# Patient Record
Sex: FEMALE | Race: WHITE | NOT HISPANIC OR LATINO | Employment: UNEMPLOYED | URBAN - NONMETROPOLITAN AREA
[De-identification: names, ages, dates, MRNs, and addresses within clinical notes are randomized per-mention and may not be internally consistent; named-entity substitution may affect disease eponyms.]

---

## 2017-01-13 ENCOUNTER — TELEPHONE (OUTPATIENT)
Dept: CARDIOLOGY | Facility: CLINIC | Age: 57
End: 2017-01-13

## 2017-01-13 NOTE — TELEPHONE ENCOUNTER
Dr. Joy is requesting cardiac clearance for hip surgery.  On warfarin, monitored by PCP-history of AVR-[facility]

## 2017-01-17 ENCOUNTER — CLINICAL SUPPORT (OUTPATIENT)
Dept: CARDIOLOGY | Facility: CLINIC | Age: 57
End: 2017-01-17

## 2017-01-17 ENCOUNTER — TELEPHONE (OUTPATIENT)
Dept: CARDIOLOGY | Facility: CLINIC | Age: 57
End: 2017-01-17

## 2017-01-17 VITALS — DIASTOLIC BLOOD PRESSURE: 82 MMHG | SYSTOLIC BLOOD PRESSURE: 142 MMHG | HEART RATE: 83 BPM

## 2017-01-17 NOTE — TELEPHONE ENCOUNTER
Patient was in for BP and HR check. You added Furosemide 20 mg 1/2 tab QD on 12/12/16.   /82 HR 83

## 2017-09-06 ENCOUNTER — OFFICE VISIT (OUTPATIENT)
Dept: CARDIOLOGY | Facility: CLINIC | Age: 57
End: 2017-09-06

## 2017-09-06 VITALS
WEIGHT: 144 LBS | SYSTOLIC BLOOD PRESSURE: 130 MMHG | BODY MASS INDEX: 26.5 KG/M2 | DIASTOLIC BLOOD PRESSURE: 70 MMHG | HEIGHT: 62 IN | HEART RATE: 80 BPM

## 2017-09-06 DIAGNOSIS — E78.2 MIXED HYPERLIPIDEMIA: ICD-10-CM

## 2017-09-06 DIAGNOSIS — Z95.2 S/P AVR: Primary | ICD-10-CM

## 2017-09-06 DIAGNOSIS — Z79.01 CURRENT USE OF LONG TERM ANTICOAGULATION: ICD-10-CM

## 2017-09-06 DIAGNOSIS — J44.9 COPD MIXED TYPE (HCC): ICD-10-CM

## 2017-09-06 PROCEDURE — 99213 OFFICE O/P EST LOW 20 MIN: CPT | Performed by: NURSE PRACTITIONER

## 2017-09-06 RX ORDER — PRAVASTATIN SODIUM 20 MG
20 TABLET ORAL DAILY
COMMUNITY
End: 2022-04-21

## 2017-09-06 RX ORDER — GUAIFENESIN 600 MG/1
600 TABLET, EXTENDED RELEASE ORAL 2 TIMES DAILY PRN
COMMUNITY
End: 2022-04-21

## 2017-09-06 RX ORDER — FENOFIBRATE 160 MG/1
160 TABLET ORAL DAILY
COMMUNITY
End: 2021-03-26 | Stop reason: ALTCHOICE

## 2017-09-06 NOTE — PROGRESS NOTES
"Chief Complaint   Patient presents with   • Follow-up     cardiac management, no recent labs, patient brought medication list with visit.   • Shortness of Breath     \"when walking or over exertion\" patient wears oxygen at 2 liters via NC.        Subjective       Estefany Castañeda is a 56 y.o. female  with a history of bicuspid aortic valve, S/P aortic valve replacement in 1995.  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  She was referred in May 2016 for chest pain and shortness of breath.  Cardiac workup done at that time showed no ischemia, good LV function, and an aortic valve replacement that was well seated and functioning properly.  In December 2016, PCP called the office to report symptoms including chest pain. Repeat echo was done and no changes noted. She continues to be managed medically.   Today she comes to the office and cardiac complaints are voiced. No signs of bleeding reported. Overall, she feels she is stable.     HPI         Cardiac History:    Past Surgical History:   Procedure Laterality Date   • AORTIC VALVE REPAIR/REPLACEMENT  09/1995    [facility]    • CARDIAC CATHETERIZATION  07/18/2009    () Normal Coronaries. Acute takeoff of the LM.    • CARDIOVASCULAR STRESS TEST  05/19/2016    EF 65%, no ischemia   • ECHO - CONVERTED  07/20/2009    () Normal Coronaries. Acute takeoff of the LM.    • ECHO - CONVERTED  05/19/2016    EF 50-55%, mod AS, AVR well seated and well functioning   • ECHO - CONVERTED  12/08/2016    EF 55-60%, mild to mod AS, RVSP 23 mmHg   • US CAROTID UNILATERAL  11/17/2016    bilateral- no hemodynamically significant stenosis       Current Outpatient Prescriptions   Medication Sig Dispense Refill   • Albuterol Sulfate (PROAIR HFA IN) Inhale As Needed.     • diphenhydrAMINE (DIPHENHIST) 25 MG tablet Take 25 mg by mouth Every 6 (Six) Hours As Needed for itching.     • " fenofibrate 160 MG tablet Take 160 mg by mouth Daily.     • gabapentin (NEURONTIN) 800 MG tablet Take 800 mg by mouth 3 (Three) Times a Day.     • guaiFENesin (MUCINEX) 600 MG 12 hr tablet Take 600 mg by mouth 2 (Two) Times a Day.     • insulin aspart (novoLOG) 100 UNIT/ML injection Inject  under the skin 3 (Three) Times a Day Before Meals. Per sliding scale     • insulin glargine (LANTUS) 100 UNIT/ML injection Inject  under the skin Every Night.     • losartan (COZAAR) 25 MG tablet Take 25 mg by mouth Daily.     • Multiple Vitamins-Minerals (CENTRUM SILVER PO) Take  by mouth Daily.     • pravastatin (PRAVACHOL) 20 MG tablet Take 20 mg by mouth Daily.     • raNITIdine (ZANTAC) 150 MG tablet Take 150 mg by mouth 2 (Two) Times a Day.     • sertraline (ZOLOFT) 100 MG tablet Take 100 mg by mouth Daily.     • SITagliptin (JANUVIA) 100 MG tablet Take 100 mg by mouth Daily.     • tiotropium (SPIRIVA) 18 MCG per inhalation capsule Place 1 capsule into inhaler and inhale Daily.     • tiZANidine (ZANAFLEX) 4 MG tablet Take 4 mg by mouth Every Night.     • traZODone (DESYREL) 50 MG tablet Take 50 mg by mouth Every Night.     • warfarin (COUMADIN) 5 MG tablet Take 5 mg by mouth Daily.       No current facility-administered medications for this visit.        Capsaicin and Demerol [meperidine]    Past Medical History:   Diagnosis Date   • Aneurysm      clipping   • Anxiety    • COPD (chronic obstructive pulmonary disease)    • CVA (cerebral vascular accident)    • Depression    • Diabetes mellitus    • GERD (gastroesophageal reflux disease)    • Hypercholesterolemia    • Hypertension    • Mechanical heart valve present        Social History     Social History   • Marital status:      Spouse name: N/A   • Number of children: N/A   • Years of education: N/A     Occupational History   • Not on file.     Social History Main Topics   • Smoking status: Former Smoker     Quit date: 2009   • Smokeless tobacco: Never Used       "Comment: Quit 6 years ago   • Alcohol use Yes      Comment: socially, occasional   • Drug use: No   • Sexual activity: Not on file     Other Topics Concern   • Not on file     Social History Narrative       Family History   Problem Relation Age of Onset   • COPD Mother        Review of Systems   Constitution: Negative for decreased appetite and malaise/fatigue.   HENT: Negative for congestion, nosebleeds and sore throat.    Eyes: Negative for blurred vision.   Respiratory: Positive for shortness of breath. Negative for sleep disturbances due to breathing (uses oxygen).    Endocrine: Negative for cold intolerance and heat intolerance.   Hematologic/Lymphatic: Negative for bleeding problem. Does not bruise/bleed easily.   Skin: Negative for itching, nail changes and skin cancer.   Musculoskeletal: Negative for arthritis and myalgias.   Gastrointestinal: Negative for abdominal pain, dysphagia, heartburn, melena and nausea.   Genitourinary: Negative for dysuria and hematuria.   Neurological: Negative for dizziness, light-headedness, seizures and vertigo.   Psychiatric/Behavioral: Negative for altered mental status and memory loss.   Allergic/Immunologic: Negative for environmental allergies and hives.    Diabetes- Yes  Thyroid-normal    Objective     /70 (BP Location: Left arm)  Pulse 80  Ht 62\" (157.5 cm)  Wt 144 lb (65.3 kg)  BMI 26.34 kg/m2    Physical Exam   Constitutional: She is oriented to person, place, and time. She appears well-nourished.   HENT:   Head: Normocephalic.   Eyes: Conjunctivae are normal. Pupils are equal, round, and reactive to light.   Neck: Normal range of motion. No JVD present.   Cardiovascular: Normal rate, regular rhythm, S1 normal, S2 normal and normal pulses.    Murmur heard.  High-pitched blowing early diastolic murmur is present with a grade of 3/6  at the upper right sternal border Metalic click  Pulmonary/Chest: She has decreased breath sounds in the right lower field and " the left lower field. She has no wheezes. She has no rhonchi. She has no rales.   Wearing portable oxygen per NC   Abdominal: Soft. Bowel sounds are normal. There is no tenderness.   Musculoskeletal: Normal range of motion. She exhibits no edema.   Neurological: She is alert and oriented to person, place, and time.   Skin: Skin is warm and dry. No rash noted. No pallor.   Psychiatric: She has a normal mood and affect. Her behavior is normal.      Procedures        Assessment/Plan      Estefany was seen today for follow-up and shortness of breath.    Diagnoses and all orders for this visit:    S/P AVR    Current use of long term anticoagulation    Mixed hyperlipidemia    COPD mixed type        She follow with you for management of labs and reports lipids have been at goal. Her blood pressure and heart today are normal. The reports of her carotid ultrasound and last echocardiogram were reviewed. No further cardiac testing warranted at this time. Same cardiac medications recommended. Her weight is stable. I encouraged her on good diet and maintaining activity as tolerates.            Electronically signed by CHELSIE Suggs,  September 8, 2017 12:56 PM

## 2018-09-11 ENCOUNTER — OFFICE VISIT (OUTPATIENT)
Dept: CARDIOLOGY | Facility: CLINIC | Age: 58
End: 2018-09-11

## 2018-09-11 VITALS
SYSTOLIC BLOOD PRESSURE: 128 MMHG | DIASTOLIC BLOOD PRESSURE: 68 MMHG | WEIGHT: 139 LBS | HEIGHT: 62 IN | HEART RATE: 76 BPM | BODY MASS INDEX: 25.58 KG/M2

## 2018-09-11 DIAGNOSIS — E11.8 TYPE 2 DIABETES MELLITUS WITH COMPLICATION, WITH LONG-TERM CURRENT USE OF INSULIN (HCC): ICD-10-CM

## 2018-09-11 DIAGNOSIS — Z95.2 S/P AVR: ICD-10-CM

## 2018-09-11 DIAGNOSIS — J44.9 COPD MIXED TYPE (HCC): ICD-10-CM

## 2018-09-11 DIAGNOSIS — Z95.2 AORTIC VALVE REPLACED: Primary | ICD-10-CM

## 2018-09-11 DIAGNOSIS — Z79.4 TYPE 2 DIABETES MELLITUS WITH COMPLICATION, WITH LONG-TERM CURRENT USE OF INSULIN (HCC): ICD-10-CM

## 2018-09-11 DIAGNOSIS — E78.2 MIXED HYPERLIPIDEMIA: ICD-10-CM

## 2018-09-11 PROCEDURE — 99213 OFFICE O/P EST LOW 20 MIN: CPT | Performed by: NURSE PRACTITIONER

## 2018-09-11 RX ORDER — IRBESARTAN 150 MG/1
150 TABLET ORAL DAILY
COMMUNITY
End: 2021-03-26 | Stop reason: ALTCHOICE

## 2018-09-11 RX ORDER — MELATONIN
1000 DAILY
COMMUNITY
End: 2023-02-27

## 2018-09-11 RX ORDER — PAROXETINE HYDROCHLORIDE 40 MG/1
40 TABLET, FILM COATED ORAL EVERY MORNING
COMMUNITY
End: 2020-03-17 | Stop reason: ALTCHOICE

## 2018-09-11 NOTE — PROGRESS NOTES
Chief Complaint   Patient presents with   • Follow-up     Cardiac management. She reports to have labs in 3 weeks per PCP. PCP refills medication.    • Shortness of Breath     Relates to COPD.       Subjective       Estefany Castañeda is a 57 y.o. female with a history of hypertension, hyperlipidemia, diabetes, COPD with remote tobacco use, and bicuspid aortic valve, S/P aortic valve replacement in 1995.  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  She was referred in May 2016 for chest pain and shortness of breath.  Cardiac workup done at that time showed no ischemia, normal LV function, and an aortic valve replacement that was well seated and functioning properly.  In December 2016, PCP called the office to report symptoms including chest pain. Repeat echo was done and no changes noted. She continues to be managed medically. She is anticoagulated with warfarin managed by PCP. She came in today for follow up. She feels well. Denies chest pain, dizziness, syncope. She is short of breath which is chronic and unchanged. Labs will be checked at upcoming appointment with PCP in three weeks. No refills needed.  HPI         Cardiac History:    Past Surgical History:   Procedure Laterality Date   • AORTIC VALVE REPAIR/REPLACEMENT  09/1995    [facility]    • CARDIAC CATHETERIZATION  07/18/2009    () Normal Coronaries. Acute takeoff of the LM.    • CARDIOVASCULAR STRESS TEST  05/19/2016    EF 65%, no ischemia   • ECHO - CONVERTED  07/20/2009    () Normal Coronaries. Acute takeoff of the LM.    • ECHO - CONVERTED  05/19/2016    EF 50-55%, mod AS, AVR well seated and well functioning   • ECHO - CONVERTED  12/08/2016    EF 55-60%, mild to mod AS, RVSP 23 mmHg   • US CAROTID UNILATERAL  11/17/2016    bilateral- no hemodynamically significant stenosis       Current Outpatient Prescriptions   Medication Sig Dispense Refill    • Albuterol Sulfate (PROAIR HFA IN) Inhale As Needed.     • cholecalciferol (VITAMIN D3) 1000 units tablet Take 1,000 Units by mouth Daily.     • diphenhydrAMINE (DIPHENHIST) 25 MG tablet Take 25 mg by mouth 2 (Two) Times a Day.     • fenofibrate 160 MG tablet Take 160 mg by mouth Daily.     • gabapentin (NEURONTIN) 800 MG tablet Take 800 mg by mouth 3 (Three) Times a Day.     • guaiFENesin (MUCINEX) 600 MG 12 hr tablet Take 600 mg by mouth 2 (Two) Times a Day As Needed.     • insulin aspart (novoLOG) 100 UNIT/ML injection Inject  under the skin 3 (Three) Times a Day Before Meals. Per sliding scale     • Insulin Glargine (BASAGLAR KWIKPEN SC) Inject  under the skin into the appropriate area as directed Every Night.     • irbesartan (AVAPRO) 150 MG tablet Take 150 mg by mouth Daily.     • Multiple Vitamins-Minerals (CENTRUM SILVER PO) Take  by mouth Daily.     • O2 (OXYGEN) Inhale Every Night.     • PARoxetine (PAXIL) 40 MG tablet Take 40 mg by mouth Every Morning.     • pravastatin (PRAVACHOL) 20 MG tablet Take 20 mg by mouth Daily.     • raNITIdine (ZANTAC) 150 MG tablet Take 150 mg by mouth 2 (Two) Times a Day.     • SITagliptin (JANUVIA) 100 MG tablet Take 100 mg by mouth Daily.     • tiZANidine (ZANAFLEX) 4 MG tablet Take 4 mg by mouth Every Night.     • traZODone (DESYREL) 50 MG tablet Take 50 mg by mouth Every Night.     • warfarin (COUMADIN) 5 MG tablet Take 5 mg by mouth Daily.       No current facility-administered medications for this visit.        Capsaicin; Crestor [rosuvastatin calcium]; Demerol [meperidine]; Ibuprofen; and Lipitor [atorvastatin]    Past Medical History:   Diagnosis Date   • Aneurysm (CMS/HCC)      clipping   • Anxiety    • COPD (chronic obstructive pulmonary disease) (CMS/HCC)    • CVA (cerebral vascular accident) (CMS/HCC)    • Depression    • Diabetes mellitus (CMS/HCC)    • GERD (gastroesophageal reflux disease)    • Hx of appendectomy 12/2017   • Hypercholesterolemia    •  "Hypertension    • Mechanical heart valve present        Social History     Social History   • Marital status:      Spouse name: N/A   • Number of children: N/A   • Years of education: N/A     Occupational History   • Not on file.     Social History Main Topics   • Smoking status: Former Smoker     Quit date: 2009   • Smokeless tobacco: Never Used      Comment: Quit 6 years ago   • Alcohol use No      Comment: socially, occasional   • Drug use: No   • Sexual activity: Not on file     Other Topics Concern   • Not on file     Social History Narrative   • No narrative on file       Family History   Problem Relation Age of Onset   • COPD Mother        Review of Systems   Constitution: Positive for weight loss (down 5 lb). Negative for decreased appetite, weakness and malaise/fatigue.   HENT: Negative.    Eyes: Negative.    Cardiovascular: Positive for dyspnea on exertion (mild). Negative for chest pain, leg swelling, orthopnea, palpitations and syncope.   Respiratory: Positive for shortness of breath. Negative for cough.    Endocrine: Negative.    Hematologic/Lymphatic: Negative.  Negative for bleeding problem. Does not bruise/bleed easily.   Skin: Negative.    Musculoskeletal: Negative for falls and myalgias.   Gastrointestinal: Negative for abdominal pain, hematemesis and melena.   Genitourinary: Negative for dysuria and hematuria.   Neurological: Negative for dizziness.   Psychiatric/Behavioral: Negative for altered mental status and depression.   Allergic/Immunologic: Negative.       Diabetes- Yes  Thyroid-normal    Objective     /68 (BP Location: Left arm)   Pulse 76   Ht 157.5 cm (62.01\")   Wt 63 kg (139 lb)   BMI 25.42 kg/m²     Physical Exam   Constitutional: She is oriented to person, place, and time. She appears well-developed and well-nourished.   HENT:   Head: Normocephalic and atraumatic.   Eyes: Pupils are equal, round, and reactive to light.   Neck: Normal range of motion. "   Cardiovascular: Normal rate, regular rhythm and intact distal pulses.    Murmur heard.   Systolic murmur is present with a grade of 3/6  at the upper right sternal border  Pulmonary/Chest: Effort normal and breath sounds normal. No respiratory distress. She has no wheezes.   Abdominal: Soft. Bowel sounds are normal. She exhibits no distension.   Musculoskeletal: Normal range of motion. She exhibits no edema.   Neurological: She is alert and oriented to person, place, and time.   Skin: Skin is warm and dry.   Psychiatric: She has a normal mood and affect.   Nursing note and vitals reviewed.     Procedures          Assessment/Plan    Heart rate and blood pressure stable. She remains asymptomatic. Most recent echocardiogram from 2016 reviewed with her. She will be scheduled for repeat echo to assess mechanical aortic valve as it has been almost two years. Continue anticoagulation per [facility] management. She has no s/s bleeding. Continue current medications. She tolerates pravastatin for hyperlipidemia. Could we get a copy of upcoming labs? BMI borderline elevated. Heart healthy diet recommended. Regular exercise 20 minutes daily as she can tolerate. Further recommendations to follow echocardiogram. We will see her back in six months or sooner if needed.   Estefany was seen today for follow-up and shortness of breath.    Diagnoses and all orders for this visit:    Aortic valve replaced  -     Adult Transthoracic Echo Complete W/ Cont if Necessary Per Protocol; Future    S/P AVR    Mixed hyperlipidemia    Type 2 diabetes mellitus with complication, with long-term current use of insulin (CMS/[facility])    COPD mixed type (CMS/HCC)        Patient's Body mass index is 25.42 kg/m². BMI is above normal parameters. Recommendations include: no follow-up required and nutrition counseling.                  Electronically signed by CHELSIE Jimenez,  September 13, 2018 4:47 AM

## 2018-09-13 PROBLEM — I10 ESSENTIAL HYPERTENSION: Status: ACTIVE | Noted: 2018-09-13

## 2019-05-08 ENCOUNTER — OFFICE VISIT (OUTPATIENT)
Dept: CARDIOLOGY | Facility: CLINIC | Age: 59
End: 2019-05-08

## 2019-05-08 VITALS
DIASTOLIC BLOOD PRESSURE: 58 MMHG | OXYGEN SATURATION: 95 % | SYSTOLIC BLOOD PRESSURE: 120 MMHG | HEART RATE: 90 BPM | WEIGHT: 138.5 LBS | BODY MASS INDEX: 25.49 KG/M2 | HEIGHT: 62 IN

## 2019-05-08 DIAGNOSIS — Z79.01 CURRENT USE OF LONG TERM ANTICOAGULATION: ICD-10-CM

## 2019-05-08 DIAGNOSIS — E78.2 MIXED HYPERLIPIDEMIA: ICD-10-CM

## 2019-05-08 DIAGNOSIS — R53.83 OTHER FATIGUE: ICD-10-CM

## 2019-05-08 DIAGNOSIS — R07.89 OTHER CHEST PAIN: ICD-10-CM

## 2019-05-08 DIAGNOSIS — R06.02 SHORTNESS OF BREATH: ICD-10-CM

## 2019-05-08 DIAGNOSIS — Z95.2 S/P AVR: ICD-10-CM

## 2019-05-08 DIAGNOSIS — I10 ESSENTIAL HYPERTENSION: ICD-10-CM

## 2019-05-08 DIAGNOSIS — R01.1 CARDIAC MURMUR: Primary | ICD-10-CM

## 2019-05-08 PROCEDURE — 99214 OFFICE O/P EST MOD 30 MIN: CPT | Performed by: NURSE PRACTITIONER

## 2019-05-08 RX ORDER — ESOMEPRAZOLE MAGNESIUM 40 MG/1
40 CAPSULE, DELAYED RELEASE ORAL
COMMUNITY
End: 2022-04-21

## 2019-05-08 NOTE — PROGRESS NOTES
Chief Complaint   Patient presents with   • Follow-up     Cardiac management.Reports having chest pain, happens often feels like something sitting on her chest, lasting about 30 minutes. Does not take nitroglycerin. Shortness of breath-on 2 liters O2 unless walking then needs 3 liters.   • Med Refill     PCP refills medications. Did not bring in med list, verbally confirmed medications       Subjective       Estefany Castañeda is a 58 y.o. female  with a history of hypertension, hyperlipidemia, diabetes, COPD with remote tobacco use, and bicuspid aortic valve, S/P aortic valve replacement in 1995.  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  She was referred in May 2016 for chest pain and shortness of breath.  Cardiac workup done at that time showed no ischemia, normal LV function, and an aortic valve replacement that was well seated and functioning properly.  In December 2016, PCP called the office to report symptoms including chest pain. Repeat echo was done and no changes noted. She continues to be managed medically. She is anticoagulated with warfarin managed by PCP.     Today she returns to the office for a follow-up visit.  Recently she has developed chest discomfort in the form of pressure that occurs randomly and last about 30 minutes.  She experiences increased shortness of breath during this time.  Does continue to use supplemental oxygen.  No issues with palpitations or near syncope noted.  She also admits to daily blood glucose levels being elevated.  Overall she has more fatigue and generalized weakness.    HPI     Cardiac History:    Past Surgical History:   Procedure Laterality Date   • AORTIC VALVE REPAIR/REPLACEMENT  09/1995    [facility]    • CARDIAC CATHETERIZATION  07/18/2009    () Normal Coronaries. Acute takeoff of the LM.    • CARDIOVASCULAR STRESS TEST  05/19/2016    EF 65%, no ischemia   • ECHO  - CONVERTED  07/20/2009    () Normal Coronaries. Acute takeoff of the LM.    • ECHO - CONVERTED  05/19/2016    EF 50-55%, mod AS, AVR well seated and well functioning   • ECHO - CONVERTED  12/08/2016    EF 55-60%, mild to mod AS, RVSP 23 mmHg   • US CAROTID UNILATERAL  11/17/2016    bilateral- no hemodynamically significant stenosis       Current Outpatient Medications   Medication Sig Dispense Refill   • Albuterol Sulfate (PROAIR HFA IN) Inhale As Needed.     • cholecalciferol (VITAMIN D3) 1000 units tablet Take 1,000 Units by mouth Daily.     • diphenhydrAMINE (DIPHENHIST) 25 MG tablet Take 25 mg by mouth 2 (Two) Times a Day.     • esomeprazole (nexIUM) 40 MG capsule Take 40 mg by mouth Every Morning Before Breakfast.     • fenofibrate 160 MG tablet Take 160 mg by mouth Daily.     • gabapentin (NEURONTIN) 800 MG tablet Take 800 mg by mouth 3 (Three) Times a Day.     • guaiFENesin (MUCINEX) 600 MG 12 hr tablet Take 600 mg by mouth 2 (Two) Times a Day As Needed.     • insulin aspart (novoLOG) 100 UNIT/ML injection Inject  under the skin 3 (Three) Times a Day Before Meals. Per sliding scale     • Insulin Glargine (BASAGLAR KWIKPEN SC) Inject  under the skin into the appropriate area as directed Every Night.     • irbesartan (AVAPRO) 150 MG tablet Take 150 mg by mouth Daily.     • Multiple Vitamins-Minerals (CENTRUM SILVER PO) Take  by mouth Daily.     • O2 (OXYGEN) Inhale Every Night.     • PARoxetine (PAXIL) 40 MG tablet Take 40 mg by mouth Every Morning.     • pravastatin (PRAVACHOL) 20 MG tablet Take 20 mg by mouth Daily.     • SITagliptin (JANUVIA) 100 MG tablet Take 100 mg by mouth Daily.     • tiZANidine (ZANAFLEX) 4 MG tablet Take 4 mg by mouth Every Night.     • traZODone (DESYREL) 50 MG tablet Take 50 mg by mouth Every Night.     • warfarin (COUMADIN) 5 MG tablet Take 5 mg by mouth Daily.       No current facility-administered medications for this visit.        Capsaicin; Crestor [rosuvastatin  "calcium]; Demerol [meperidine]; Ibuprofen; and Lipitor [atorvastatin]    Past Medical History:   Diagnosis Date   • Aneurysm (CMS/HCC)      clipping   • Anxiety    • COPD (chronic obstructive pulmonary disease) (CMS/HCC)    • CVA (cerebral vascular accident) (CMS/HCC)    • Depression    • Diabetes mellitus (CMS/HCC)    • GERD (gastroesophageal reflux disease)    • Hx of appendectomy 12/2017   • Hypercholesterolemia    • Hypertension    • Mechanical heart valve present        Social History     Socioeconomic History   • Marital status:      Spouse name: Not on file   • Number of children: Not on file   • Years of education: Not on file   • Highest education level: Not on file   Tobacco Use   • Smoking status: Former Smoker     Last attempt to quit: 2009     Years since quitting: 10.3   • Smokeless tobacco: Never Used   • Tobacco comment: Quit 6 years ago   Substance and Sexual Activity   • Alcohol use: No     Comment: socially, occasional   • Drug use: No       Family History   Problem Relation Age of Onset   • COPD Mother        Review of Systems   Constitution: Positive for weakness and malaise/fatigue. Negative for decreased appetite.   HENT: Negative for congestion, hoarse voice and nosebleeds.    Eyes: Negative for pain and redness.   Cardiovascular: Positive for chest pain (\"heaviness\") and dyspnea on exertion. Negative for leg swelling and near-syncope.   Respiratory: Positive for cough, shortness of breath and sleep disturbances due to breathing (uses oxygen). Negative for sputum production.    Endocrine: Positive for cold intolerance and heat intolerance.   Hematologic/Lymphatic: Negative for bleeding problem. Does not bruise/bleed easily.   Skin: Negative for dry skin and itching.   Musculoskeletal: Positive for muscle weakness. Negative for falls and muscle cramps.   Gastrointestinal: Negative for abdominal pain, dysphagia, melena and nausea.   Genitourinary: Negative for dysuria and hematuria. " "  Neurological: Positive for light-headedness and numbness (feet ). Negative for headaches.   Psychiatric/Behavioral: Negative for altered mental status and memory loss. The patient has insomnia (at times) and is nervous/anxious.    Allergic/Immunologic: Negative for hives.        Objective     /58 (BP Location: Left arm)   Pulse 90   Ht 157.5 cm (62\")   Wt 62.8 kg (138 lb 8 oz)   SpO2 95%   BMI 25.33 kg/m²     Physical Exam   Constitutional: She is oriented to person, place, and time. She appears well-nourished.   HENT:   Head: Normocephalic.   Eyes: Conjunctivae are normal. Pupils are equal, round, and reactive to light.   Neck: Normal range of motion. Neck supple.   Cardiovascular: Normal rate, regular rhythm and S1 normal.   Murmur heard.  Loud S2   Pulmonary/Chest: She has decreased breath sounds. She has no wheezes. She has no rales.   Abdominal: Soft. Bowel sounds are normal. She exhibits no distension. There is no tenderness.   Musculoskeletal: Normal range of motion. She exhibits no edema.   Neurological: She is alert and oriented to person, place, and time.   Skin: Skin is warm and dry.   Psychiatric: She has a normal mood and affect. Her behavior is normal.      Procedures: none today      Assessment/Plan      Estefany was seen today for follow-up and med refill.    Diagnoses and all orders for this visit:    Cardiac murmur  -     Adult Transthoracic Echo Complete W/ Cont if Necessary Per Protocol; Future    Other chest pain  -     Adult Transthoracic Echo Complete W/ Cont if Necessary Per Protocol; Future  -     Stress Test With Myocardial Perfusion One Day; Future    Essential hypertension  -     Adult Transthoracic Echo Complete W/ Cont if Necessary Per Protocol; Future  -     Stress Test With Myocardial Perfusion One Day; Future    Mixed hyperlipidemia    S/P AVR  -     Adult Transthoracic Echo Complete W/ Cont if Necessary Per Protocol; Future    Current use of long term " anticoagulation    Other fatigue    Shortness of breath    It has been over 2 years since her last echocardiogram.  To reassess aortic valve replacement cardiac murmur a repeat echocardiogram ordered.  She is on Coumadin therapy and follows with you for management of PT/INR.  Currently she denies signs of bleeding and reports most recent INR therapeutic.    Estefany voices concern over recent development of chest heaviness and pressure.  She admits to difficulty controlling blood glucose.  Due to recent symptoms and cardiac risk a repeat nuclear stress test also ordered.    Her blood pressure, heart rate, and heart rhythm today are normal.  No change in medications advised at this time.    For lipid management she is on statin therapy in the form of Pravachol.  She  follows with you for lab orders/management.  Please forward a copy of lab results as available.    Patient's Body mass index is 25.33 kg/m². BMI is slightly  above normal parameters. Recommendations include: nutrition counseling. Diabetic diet encouraged.      Further recommendations based on test results.  A 6-month follow-up visit scheduled.  Please call sooner for any cardiac concerns.           Electronically signed by CHELSIE Suggs,  May 9, 2019 7:31 AM

## 2019-05-16 ENCOUNTER — HOSPITAL ENCOUNTER (OUTPATIENT)
Dept: CARDIOLOGY | Facility: HOSPITAL | Age: 59
Discharge: HOME | End: 2019-05-16

## 2019-05-16 VITALS — WEIGHT: 138.45 LBS | HEIGHT: 62 IN | BODY MASS INDEX: 25.48 KG/M2

## 2019-05-16 DIAGNOSIS — R53.83 OTHER FATIGUE: ICD-10-CM

## 2019-05-16 DIAGNOSIS — R01.1 CARDIAC MURMUR: ICD-10-CM

## 2019-05-16 DIAGNOSIS — R06.02 SHORTNESS OF BREATH: ICD-10-CM

## 2019-05-16 DIAGNOSIS — I10 ESSENTIAL HYPERTENSION: ICD-10-CM

## 2019-05-16 DIAGNOSIS — Z95.2 S/P AVR: ICD-10-CM

## 2019-05-16 DIAGNOSIS — R07.89 OTHER CHEST PAIN: ICD-10-CM

## 2019-05-16 LAB
BH CV ECHO MEAS - ACS: 1.1 CM
BH CV ECHO MEAS - AO MAX PG (FULL): 19.6 MMHG
BH CV ECHO MEAS - AO MAX PG: 25.8 MMHG
BH CV ECHO MEAS - AO MEAN PG (FULL): 9.6 MMHG
BH CV ECHO MEAS - AO MEAN PG: 12.8 MMHG
BH CV ECHO MEAS - AO ROOT AREA (BSA CORRECTED): 1.2
BH CV ECHO MEAS - AO ROOT AREA: 2.9 CM^2
BH CV ECHO MEAS - AO ROOT DIAM: 1.9 CM
BH CV ECHO MEAS - AO V2 MAX: 254 CM/SEC
BH CV ECHO MEAS - AO V2 MEAN: 165.7 CM/SEC
BH CV ECHO MEAS - AO V2 VTI: 52.8 CM
BH CV ECHO MEAS - AVA(I,A): 1.4 CM^2
BH CV ECHO MEAS - AVA(I,D): 1.4 CM^2
BH CV ECHO MEAS - AVA(V,A): 1.4 CM^2
BH CV ECHO MEAS - AVA(V,D): 1.4 CM^2
BH CV ECHO MEAS - BSA(HAYCOCK): 1.7 M^2
BH CV ECHO MEAS - BSA: 1.6 M^2
BH CV ECHO MEAS - BZI_BMI: 25.4 KILOGRAMS/M^2
BH CV ECHO MEAS - BZI_METRIC_HEIGHT: 157.5 CM
BH CV ECHO MEAS - BZI_METRIC_WEIGHT: 63.1 KG
BH CV ECHO MEAS - CI(CUBED): 6.4 L/MIN/M^2
BH CV ECHO MEAS - CI(TEICH): 6 L/MIN/M^2
BH CV ECHO MEAS - CO(CUBED): 10.5 L/MIN
BH CV ECHO MEAS - CO(TEICH): 9.8 L/MIN
BH CV ECHO MEAS - EDV(CUBED): 77.3 ML
BH CV ECHO MEAS - EDV(TEICH): 81.2 ML
BH CV ECHO MEAS - EF(CUBED): 68.9 %
BH CV ECHO MEAS - EF(TEICH): 60.8 %
BH CV ECHO MEAS - ESV(CUBED): 24.1 ML
BH CV ECHO MEAS - ESV(TEICH): 31.9 ML
BH CV ECHO MEAS - FS: 32.2 %
BH CV ECHO MEAS - IVS/LVPW: 0.86
BH CV ECHO MEAS - IVSD: 0.83 CM
BH CV ECHO MEAS - LA DIMENSION: 2.8 CM
BH CV ECHO MEAS - LA/AO: 1.5
BH CV ECHO MEAS - LAT PEAK E' VEL: 6 CM/SEC
BH CV ECHO MEAS - LV MASS(C)D: 120.3 GRAMS
BH CV ECHO MEAS - LV MASS(C)DI: 73.4 GRAMS/M^2
BH CV ECHO MEAS - LV MAX PG: 6.2 MMHG
BH CV ECHO MEAS - LV MEAN PG: 3.2 MMHG
BH CV ECHO MEAS - LV V1 MAX: 124.6 CM/SEC
BH CV ECHO MEAS - LV V1 MEAN: 81.1 CM/SEC
BH CV ECHO MEAS - LV V1 VTI: 25.1 CM
BH CV ECHO MEAS - LVIDD: 4.3 CM
BH CV ECHO MEAS - LVIDS: 2.9 CM
BH CV ECHO MEAS - LVOT AREA: 2.9 CM^2
BH CV ECHO MEAS - LVOT DIAM: 1.9 CM
BH CV ECHO MEAS - LVPWD: 0.96 CM
BH CV ECHO MEAS - MED PEAK E' VEL: 9 CM/SEC
BH CV ECHO MEAS - MITRAL HR: 171.7 BPM
BH CV ECHO MEAS - MITRAL R-R: 0.35 SEC
BH CV ECHO MEAS - MM HR: 197.8 BPM
BH CV ECHO MEAS - MM R-R INT: 0.3 SEC
BH CV ECHO MEAS - MV DEC SLOPE: 753.9 CM/SEC^2
BH CV ECHO MEAS - MV DEC TIME: 0.19 SEC
BH CV ECHO MEAS - MV E MAX VEL: 151.6 CM/SEC
BH CV ECHO MEAS - MV MAX PG: 8.9 MMHG
BH CV ECHO MEAS - MV MEAN PG: 3.1 MMHG
BH CV ECHO MEAS - MV V2 MAX: 149 CM/SEC
BH CV ECHO MEAS - MV V2 MEAN: 80.4 CM/SEC
BH CV ECHO MEAS - MV V2 VTI: 28.1 CM
BH CV ECHO MEAS - MVA(VTI): 2.6 CM^2
BH CV ECHO MEAS - PA MAX PG: 7.7 MMHG
BH CV ECHO MEAS - PA MEAN PG: 4.3 MMHG
BH CV ECHO MEAS - PA V2 MAX: 138.8 CM/SEC
BH CV ECHO MEAS - PA V2 MEAN: 96.7 CM/SEC
BH CV ECHO MEAS - PA V2 VTI: 33.1 CM
BH CV ECHO MEAS - PULM. HR: 175.2 BPM
BH CV ECHO MEAS - PULM. R-R: 0.34 SEC
BH CV ECHO MEAS - RAP SYSTOLE: 10 MMHG
BH CV ECHO MEAS - RVDD: 2.5 CM
BH CV ECHO MEAS - RVSP: 24 MMHG
BH CV ECHO MEAS - SI(AO): 92.4 ML/M^2
BH CV ECHO MEAS - SI(CUBED): 32.5 ML/M^2
BH CV ECHO MEAS - SI(LVOT): 44.6 ML/M^2
BH CV ECHO MEAS - SI(TEICH): 30.2 ML/M^2
BH CV ECHO MEAS - SV(AO): 151.4 ML
BH CV ECHO MEAS - SV(CUBED): 53.2 ML
BH CV ECHO MEAS - SV(LVOT): 73 ML
BH CV ECHO MEAS - SV(TEICH): 49.4 ML
BH CV ECHO MEAS - TR MAX VEL: 183.1 CM/SEC
BH CV ECHO MEASUREMENTS AVERAGE E/E' RATIO: 20.21
BH CV NUCLEAR PRIOR STUDY: 3
BH CV STRESS BP STAGE 1: NORMAL
BH CV STRESS COMMENTS STAGE 1: NORMAL
BH CV STRESS DOSE REGADENOSON STAGE 1: 0.4
BH CV STRESS DURATION MIN STAGE 1: 0
BH CV STRESS DURATION SEC STAGE 1: 10
BH CV STRESS HR STAGE 1: 107
BH CV STRESS PROTOCOL 1: NORMAL
BH CV STRESS RECOVERY BP: NORMAL MMHG
BH CV STRESS RECOVERY HR: 104 BPM
BH CV STRESS STAGE 1: 1
LV EF NUC BP: 65 %
MAXIMAL PREDICTED HEART RATE: 163 BPM
MAXIMAL PREDICTED HEART RATE: 163 BPM
PERCENT MAX PREDICTED HR: 65.64 %
STRESS BASELINE BP: NORMAL MMHG
STRESS BASELINE HR: 90 BPM
STRESS PERCENT HR: 77 %
STRESS POST PEAK BP: NORMAL MMHG
STRESS POST PEAK HR: 107 BPM
STRESS TARGET HR: 139 BPM
STRESS TARGET HR: 139 BPM

## 2019-05-16 PROCEDURE — 78452 HT MUSCLE IMAGE SPECT MULT: CPT

## 2019-05-16 PROCEDURE — 78452 HT MUSCLE IMAGE SPECT MULT: CPT | Performed by: INTERNAL MEDICINE

## 2019-05-16 PROCEDURE — 0 TECHNETIUM SESTAMIBI: Performed by: INTERNAL MEDICINE

## 2019-05-16 PROCEDURE — 93306 TTE W/DOPPLER COMPLETE: CPT

## 2019-05-16 PROCEDURE — 25010000002 REGADENOSON 0.4 MG/5ML SOLUTION: Performed by: INTERNAL MEDICINE

## 2019-05-16 PROCEDURE — 93017 CV STRESS TEST TRACING ONLY: CPT

## 2019-05-16 PROCEDURE — 93306 TTE W/DOPPLER COMPLETE: CPT | Performed by: INTERNAL MEDICINE

## 2019-05-16 PROCEDURE — A9500 TC99M SESTAMIBI: HCPCS | Performed by: INTERNAL MEDICINE

## 2019-05-16 PROCEDURE — 93018 CV STRESS TEST I&R ONLY: CPT | Performed by: INTERNAL MEDICINE

## 2019-05-16 RX ADMIN — REGADENOSON 0.4 MG: 0.08 INJECTION, SOLUTION INTRAVENOUS at 08:38

## 2019-05-16 RX ADMIN — TECHNETIUM TC 99M SESTAMIBI 1 DOSE: 1 INJECTION INTRAVENOUS at 08:37

## 2019-05-16 RX ADMIN — TECHNETIUM TC 99M SESTAMIBI 1 DOSE: 1 INJECTION INTRAVENOUS at 08:38

## 2019-05-17 NOTE — TELEPHONE ENCOUNTER
Called to make patient aware of results of  new orders for Metoprolol . Sent in to pharmacy . Patient states understanding

## 2020-03-17 ENCOUNTER — OFFICE VISIT (OUTPATIENT)
Dept: CARDIOLOGY | Facility: CLINIC | Age: 60
End: 2020-03-17

## 2020-03-17 VITALS
HEART RATE: 78 BPM | DIASTOLIC BLOOD PRESSURE: 70 MMHG | SYSTOLIC BLOOD PRESSURE: 124 MMHG | WEIGHT: 147 LBS | BODY MASS INDEX: 27.05 KG/M2 | HEIGHT: 62 IN

## 2020-03-17 DIAGNOSIS — R94.31 ABNORMAL EKG: ICD-10-CM

## 2020-03-17 DIAGNOSIS — I35.0 AORTIC STENOSIS, MODERATE: ICD-10-CM

## 2020-03-17 DIAGNOSIS — R10.84 GENERALIZED ABDOMINAL PAIN: ICD-10-CM

## 2020-03-17 DIAGNOSIS — Z87.891 FORMER SMOKER: Primary | ICD-10-CM

## 2020-03-17 DIAGNOSIS — J44.9 COPD MIXED TYPE (HCC): ICD-10-CM

## 2020-03-17 DIAGNOSIS — I49.3 PVC (PREMATURE VENTRICULAR CONTRACTION): ICD-10-CM

## 2020-03-17 DIAGNOSIS — I10 ESSENTIAL HYPERTENSION: ICD-10-CM

## 2020-03-17 DIAGNOSIS — Z79.4 TYPE 2 DIABETES MELLITUS WITH COMPLICATION, WITH LONG-TERM CURRENT USE OF INSULIN (HCC): ICD-10-CM

## 2020-03-17 DIAGNOSIS — R01.1 HEART MURMUR: ICD-10-CM

## 2020-03-17 DIAGNOSIS — M54.50 BACK PAIN, LUMBOSACRAL: ICD-10-CM

## 2020-03-17 DIAGNOSIS — R06.02 SHORTNESS OF BREATH: ICD-10-CM

## 2020-03-17 DIAGNOSIS — E11.8 TYPE 2 DIABETES MELLITUS WITH COMPLICATION, WITH LONG-TERM CURRENT USE OF INSULIN (HCC): ICD-10-CM

## 2020-03-17 PROCEDURE — 99214 OFFICE O/P EST MOD 30 MIN: CPT | Performed by: NURSE PRACTITIONER

## 2020-03-17 PROCEDURE — 93000 ELECTROCARDIOGRAM COMPLETE: CPT | Performed by: NURSE PRACTITIONER

## 2020-03-17 NOTE — PROGRESS NOTES
Chief Complaint   Patient presents with   • Follow-up     For cardiac management. Patient is not on aspirin. PCP manages coumadin. Had to have a pelvic ultrasound and Dr. Villalobos and stated that there was plaque build up around the aorta. States that she does still have shortness of breath even with oxygen. Last lab work was done in January 2020 per PCP, not in chart.  Pulse felt irregular, EKG was done.   • Med Refill     PCP does medication refills. Went over medications verbally.        Cardiac Complaints  Dyspnea      Subjective   Estefany Castañeda is a 59 y.o. female with hypertension, hyperlipidemia, diabetes, COPD with remote tobacco use, and bicuspid aortic valve, S/P aortic valve replacement in 1995.  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  She was referred in May 2016 for chest pain and shortness of breath.  Cardiac workup done at that time showed no ischemia, normal LV function, and an aortic valve replacement that was well seated and functioning properly.  In December 2016, PCP called the office to report symptoms including chest pain. Repeat echo was done and no changes noted. She continues to be managed medically. She is anticoagulated with warfarin managed by PCP. She returned in May 2019 complaining of chest pain and shortness of breath, repeat cardiac workup was recommended. Stress and echo advised showed no ischemia, good LV function, and moderate AS with GEENA of 1.4, valve well seated and functioning properly.     She returns today for follow up and denies any new concerns. She does continue to have shortness of breath but denies any worse than before, she is still using supplemental oxygen without concerns. Recent CT of the abdomen due to pain showed moderate atherosclerosis of the aorta. Labs remain followed by PCP and will be rechecked again in a week. She reports PT/INR followed by PCP.  No  refills currently needed. She remotes smoke free.           Cardiac History  Past Surgical History:   Procedure Laterality Date   • AORTIC VALVE REPAIR/REPLACEMENT  09/1995    [facility]    • CARDIAC CATHETERIZATION  07/18/2009    () Normal Coronaries. Acute takeoff of the LM.    • CARDIOVASCULAR STRESS TEST  05/19/2016    EF 65%, no ischemia   • CARDIOVASCULAR STRESS TEST  05/16/2019    L.Cardiolite- EF 65%. Negative.   • ECHO - CONVERTED  07/20/2009    () Normal Coronaries. Acute takeoff of the LM.    • ECHO - CONVERTED  05/19/2016    EF 50-55%, mod AS, AVR well seated and well functioning   • ECHO - CONVERTED  12/08/2016    EF 55-60%. GEENA- 1.5 Cm2. Gr- 34 mmHg. RVSP 23 mmHg   • ECHO - CONVERTED  05/16/2019    EF 65%. AVR. GEENA- 1.4 Cm2. Gr- 25 mmHg. Mild MR. RVSP- 24 mmHg.   • US CAROTID UNILATERAL  11/17/2016    bilateral- no hemodynamically significant stenosis       Current Outpatient Medications   Medication Sig Dispense Refill   • Albuterol Sulfate (PROAIR HFA IN) Inhale As Needed.     • cholecalciferol (VITAMIN D3) 1000 units tablet Take 1,000 Units by mouth Daily.     • diphenhydrAMINE (DIPHENHIST) 25 MG tablet Take 25 mg by mouth 2 (Two) Times a Day.     • esomeprazole (nexIUM) 40 MG capsule Take 40 mg by mouth Every Morning Before Breakfast.     • fenofibrate 160 MG tablet Take 160 mg by mouth Daily.     • gabapentin (NEURONTIN) 800 MG tablet Take 800 mg by mouth 3 (Three) Times a Day.     • guaiFENesin (MUCINEX) 600 MG 12 hr tablet Take 600 mg by mouth 2 (Two) Times a Day As Needed.     • insulin aspart (novoLOG) 100 UNIT/ML injection Inject  under the skin 3 (Three) Times a Day Before Meals. Per sliding scale     • Insulin Glargine (BASAGLAR KWIKPEN SC) Inject  under the skin into the appropriate area as directed Every Night.     • irbesartan (AVAPRO) 150 MG tablet Take 150 mg by mouth Daily.     • metoprolol tartrate (LOPRESSOR) 25 MG tablet Take 1 tablet by mouth 2 (Two) Times a Day. 60  tablet 11   • Multiple Vitamins-Minerals (CENTRUM SILVER PO) Take  by mouth Daily.     • O2 (OXYGEN) Inhale 2 L/min Daily.     • pravastatin (PRAVACHOL) 20 MG tablet Take 20 mg by mouth Daily.     • SITagliptin (JANUVIA) 100 MG tablet Take 100 mg by mouth Daily.     • tiZANidine (ZANAFLEX) 4 MG tablet Take 4 mg by mouth Every Night.     • traZODone (DESYREL) 50 MG tablet Take 50 mg by mouth Every Night.     • warfarin (COUMADIN) 5 MG tablet Take 5 mg by mouth Daily.       No current facility-administered medications for this visit.        Capsaicin; Crestor [rosuvastatin calcium]; Demerol [meperidine]; Ibuprofen; and Lipitor [atorvastatin]    Past Medical History:   Diagnosis Date   • Aneurysm (CMS/HCC)      clipping   • Anxiety    • COPD (chronic obstructive pulmonary disease) (CMS/HCC)    • CVA (cerebral vascular accident) (CMS/HCC)    • Depression    • Diabetes mellitus (CMS/HCC)    • GERD (gastroesophageal reflux disease)    • Hx of appendectomy 2017   • Hypercholesterolemia    • Hypertension    • Mechanical heart valve present        Social History     Socioeconomic History   • Marital status:      Spouse name: Not on file   • Number of children: Not on file   • Years of education: Not on file   • Highest education level: Not on file   Tobacco Use   • Smoking status: Former Smoker     Last attempt to quit: 2009     Years since quittin.2   • Smokeless tobacco: Never Used   • Tobacco comment: Quit 6 years ago   Substance and Sexual Activity   • Alcohol use: No     Comment: socially, occasional   • Drug use: No       Family History   Problem Relation Age of Onset   • COPD Mother        Review of Systems   Constitution: Negative for malaise/fatigue and night sweats.   Cardiovascular: Positive for dyspnea on exertion. Negative for chest pain, claudication, irregular heartbeat, leg swelling, near-syncope, orthopnea, palpitations and syncope.   Respiratory: Positive for shortness of breath. Negative for  "cough and wheezing.    Musculoskeletal: Negative for back pain, joint pain and joint swelling.   Gastrointestinal: Negative for anorexia, heartburn, melena, nausea and vomiting.   Genitourinary: Negative for dysuria, hematuria, hesitancy and nocturia.   Neurological: Negative for dizziness, light-headedness and loss of balance.   Psychiatric/Behavioral: Negative for depression and memory loss. The patient is not nervous/anxious.            Objective     /70 (BP Location: Right arm)   Pulse 78   Ht 157 cm (61.81\")   Wt 66.7 kg (147 lb)   BMI 27.05 kg/m²     Physical Exam   Constitutional: She is oriented to person, place, and time. She appears well-developed and well-nourished.   HENT:   Head: Normocephalic and atraumatic.   Eyes: Pupils are equal, round, and reactive to light. EOM are normal.   Neck: Normal range of motion. Neck supple.   Cardiovascular: Normal rate. A regularly irregular rhythm present.   Murmur heard.  Pulmonary/Chest: Effort normal and breath sounds normal.   Abdominal: Soft.   Musculoskeletal: Normal range of motion.   Neurological: She is alert and oriented to person, place, and time.   Skin: Skin is warm and dry.   Psychiatric: She has a normal mood and affect. Her behavior is normal.         ECG 12 Lead  Date/Time: 3/17/2020 2:37 PM  Performed by: Azeb Givens APRN  Authorized by: Azeb Givens APRN   Comparison: compared with previous ECG from 5/10/2016  Similar to previous ECG  Rhythm: sinus rhythm  Ectopy: unifocal PVCs  BPM: 78    Clinical impression: abnormal EKG  Comments: Normal QT            Assessment/Plan     HTN:  Blood pressure stable on current. She will remain on current avapro and lopressor therapy.      Irregular pulse:  EKG done today in regards shows a NSR with RBBB and 3 unifocal PVC.  QT prolonged at 483ms. Patient urged to have labs including MAG, TSH, and CMP drawn. More recommendations to follow.    Cardiac status:  Most recent cardiac workup negative " for ischemia. New symptoms denied.      Murmur/AVR:  Repeat echo will be advised to reassess GEENA s/p AVR. More recommendations to follow.  She remains anticoagulated with coumadin therapy and tolerates without concerns. PT/INR followed by your office. Can we have next for review?     US of aorta recommended for abnormal CT of abdomen and continued abdominal and back pain. More recommendations to follow.     Hyperlipidemia:  Remains on statin and fenofibrate therapy and tolerates well. Side effects denied. FLP to be checked by your office next week. Can we have a copy for our review?     SOA/COPD:  Continues to use supplemental oxygen therapy. Continues to be smoke free.     DM:  Insulin dependent. Followed by your office. She states AIC to be checked next visit. Can we have for our review?     BMI noted at 27.05, good cardiac ADA diet with limited carbs/calories and activity as tolerated.     No refills needed as she reports with PCP.        Problems Addressed this Visit        Cardiovascular and Mediastinum    Essential hypertension    Relevant Orders    US aaa screen limited       Respiratory    COPD mixed type (CMS/HCC)       Endocrine    Type 2 diabetes mellitus with complication, with long-term current use of insulin (CMS/HCC)      Other Visit Diagnoses     Former smoker    -  Primary    Relevant Orders    US aaa screen limited    Shortness of breath        Relevant Orders    Adult Transthoracic Echo Complete W/ Cont if Necessary Per Protocol    Aortic stenosis, moderate        Relevant Orders    Adult Transthoracic Echo Complete W/ Cont if Necessary Per Protocol    Heart murmur        Relevant Orders    Adult Transthoracic Echo Complete W/ Cont if Necessary Per Protocol    PVC (premature ventricular contraction)        Relevant Orders    ECG 12 Lead (Completed)    Comprehensive Metabolic Panel    TSH    Magnesium    Generalized abdominal pain        Relevant Orders    US aaa screen limited    Back pain,  lumbosacral        Relevant Orders    US aaa screen limited    Abnormal EKG        Relevant Orders    Comprehensive Metabolic Panel    TSH    Magnesium          Patient's Body mass index is 27.05 kg/m². BMI is above normal parameters. Recommendations include: nutrition counseling.              Electronically signed by CHELSIE Chopra March 17, 2020 17:18

## 2020-03-19 ENCOUNTER — APPOINTMENT (OUTPATIENT)
Dept: WOMENS IMAGING | Facility: HOSPITAL | Age: 60
End: 2020-03-19

## 2020-03-19 PROCEDURE — 77067 SCR MAMMO BI INCL CAD: CPT | Performed by: RADIOLOGY

## 2020-03-19 PROCEDURE — 77063 BREAST TOMOSYNTHESIS BI: CPT | Performed by: RADIOLOGY

## 2020-03-25 ENCOUNTER — HOSPITAL ENCOUNTER (OUTPATIENT)
Dept: CARDIOLOGY | Facility: HOSPITAL | Age: 60
Discharge: HOME | End: 2020-03-25
Admitting: NURSE PRACTITIONER

## 2020-03-25 ENCOUNTER — HOSPITAL ENCOUNTER (OUTPATIENT)
Dept: CARDIOLOGY | Facility: HOSPITAL | Age: 60
Discharge: HOME | End: 2020-03-25

## 2020-03-25 ENCOUNTER — LAB (OUTPATIENT)
Dept: LAB | Facility: HOSPITAL | Age: 60
End: 2020-03-25

## 2020-03-25 DIAGNOSIS — R94.31 ABNORMAL EKG: ICD-10-CM

## 2020-03-25 DIAGNOSIS — R10.84 GENERALIZED ABDOMINAL PAIN: ICD-10-CM

## 2020-03-25 DIAGNOSIS — I49.3 PVC (PREMATURE VENTRICULAR CONTRACTION): ICD-10-CM

## 2020-03-25 DIAGNOSIS — R06.02 SHORTNESS OF BREATH: ICD-10-CM

## 2020-03-25 DIAGNOSIS — I35.0 AORTIC STENOSIS, MODERATE: ICD-10-CM

## 2020-03-25 DIAGNOSIS — R01.1 HEART MURMUR: ICD-10-CM

## 2020-03-25 DIAGNOSIS — Z87.891 FORMER SMOKER: ICD-10-CM

## 2020-03-25 DIAGNOSIS — I10 ESSENTIAL HYPERTENSION: ICD-10-CM

## 2020-03-25 DIAGNOSIS — M54.50 BACK PAIN, LUMBOSACRAL: ICD-10-CM

## 2020-03-25 LAB
ALBUMIN SERPL-MCNC: 4.26 G/DL (ref 3.5–5.2)
ALBUMIN/GLOB SERPL: 1.4 G/DL
ALP SERPL-CCNC: 76 U/L (ref 39–117)
ALT SERPL W P-5'-P-CCNC: 20 U/L (ref 1–33)
ANION GAP SERPL CALCULATED.3IONS-SCNC: 18.2 MMOL/L (ref 5–15)
AST SERPL-CCNC: 20 U/L (ref 1–32)
BILIRUB SERPL-MCNC: 0.5 MG/DL (ref 0.2–1.2)
BUN BLD-MCNC: 17 MG/DL (ref 6–20)
BUN/CREAT SERPL: 26.6 (ref 7–25)
CALCIUM SPEC-SCNC: 9.5 MG/DL (ref 8.6–10.5)
CHLORIDE SERPL-SCNC: 96 MMOL/L (ref 98–107)
CO2 SERPL-SCNC: 26.8 MMOL/L (ref 22–29)
CREAT BLD-MCNC: 0.64 MG/DL (ref 0.57–1)
GFR SERPL CREATININE-BSD FRML MDRD: 95 ML/MIN/1.73
GLOBULIN UR ELPH-MCNC: 3 GM/DL
GLUCOSE BLD-MCNC: 316 MG/DL (ref 65–99)
MAGNESIUM SERPL-MCNC: 1.5 MG/DL (ref 1.6–2.6)
POTASSIUM BLD-SCNC: 4.6 MMOL/L (ref 3.5–5.2)
PROT SERPL-MCNC: 7.3 G/DL (ref 6–8.5)
SODIUM BLD-SCNC: 141 MMOL/L (ref 136–145)
TSH SERPL DL<=0.05 MIU/L-ACNC: 0.35 UIU/ML (ref 0.27–4.2)

## 2020-03-25 PROCEDURE — 80053 COMPREHEN METABOLIC PANEL: CPT | Performed by: NURSE PRACTITIONER

## 2020-03-25 PROCEDURE — 83735 ASSAY OF MAGNESIUM: CPT | Performed by: NURSE PRACTITIONER

## 2020-03-25 PROCEDURE — 76706 US ABDL AORTA SCREEN AAA: CPT | Performed by: RADIOLOGY

## 2020-03-25 PROCEDURE — 93306 TTE W/DOPPLER COMPLETE: CPT

## 2020-03-25 PROCEDURE — 84443 ASSAY THYROID STIM HORMONE: CPT | Performed by: NURSE PRACTITIONER

## 2020-03-25 PROCEDURE — 76706 US ABDL AORTA SCREEN AAA: CPT

## 2020-03-25 PROCEDURE — 93306 TTE W/DOPPLER COMPLETE: CPT | Performed by: INTERNAL MEDICINE

## 2020-03-26 LAB
BH CV ECHO MEAS - ACS: 1.1 CM
BH CV ECHO MEAS - AO MAX PG (FULL): 26.2 MMHG
BH CV ECHO MEAS - AO MAX PG: 28.8 MMHG
BH CV ECHO MEAS - AO MEAN PG (FULL): 15 MMHG
BH CV ECHO MEAS - AO MEAN PG: 17 MMHG
BH CV ECHO MEAS - AO ROOT AREA (BSA CORRECTED): 2
BH CV ECHO MEAS - AO ROOT AREA: 8.3 CM^2
BH CV ECHO MEAS - AO ROOT DIAM: 3.3 CM
BH CV ECHO MEAS - AO V2 MAX: 267.7 CM/SEC
BH CV ECHO MEAS - AO V2 MEAN: 192 CM/SEC
BH CV ECHO MEAS - AO V2 VTI: 63.7 CM
BH CV ECHO MEAS - AVA(I,A): 1.2 CM^2
BH CV ECHO MEAS - AVA(I,D): 1.2 CM^2
BH CV ECHO MEAS - AVA(V,A): 0.9 CM^2
BH CV ECHO MEAS - AVA(V,D): 0.9 CM^2
BH CV ECHO MEAS - BSA(HAYCOCK): 1.7 M^2
BH CV ECHO MEAS - BSA: 1.7 M^2
BH CV ECHO MEAS - BZI_BMI: 27.8 KILOGRAMS/M^2
BH CV ECHO MEAS - BZI_METRIC_HEIGHT: 154.9 CM
BH CV ECHO MEAS - BZI_METRIC_WEIGHT: 66.7 KG
BH CV ECHO MEAS - EDV(CUBED): 33.1 ML
BH CV ECHO MEAS - EDV(MOD-SP4): 63.6 ML
BH CV ECHO MEAS - EDV(TEICH): 41.3 ML
BH CV ECHO MEAS - EF(CUBED): 48.1 %
BH CV ECHO MEAS - EF(MOD-SP4): 51.6 %
BH CV ECHO MEAS - EF(TEICH): 41.5 %
BH CV ECHO MEAS - ESV(CUBED): 17.2 ML
BH CV ECHO MEAS - ESV(MOD-SP4): 30.8 ML
BH CV ECHO MEAS - ESV(TEICH): 24.1 ML
BH CV ECHO MEAS - FS: 19.6 %
BH CV ECHO MEAS - IVS/LVPW: 0.89
BH CV ECHO MEAS - IVSD: 1.2 CM
BH CV ECHO MEAS - LA DIMENSION: 3.5 CM
BH CV ECHO MEAS - LA/AO: 1.1
BH CV ECHO MEAS - LV DIASTOLIC VOL/BSA (35-75): 38.4 ML/M^2
BH CV ECHO MEAS - LV IVRT: 0.08 SEC
BH CV ECHO MEAS - LV MASS(C)D: 126.4 GRAMS
BH CV ECHO MEAS - LV MASS(C)DI: 76.2 GRAMS/M^2
BH CV ECHO MEAS - LV MAX PG: 2.6 MMHG
BH CV ECHO MEAS - LV MEAN PG: 2 MMHG
BH CV ECHO MEAS - LV SYSTOLIC VOL/BSA (12-30): 18.6 ML/M^2
BH CV ECHO MEAS - LV V1 MAX: 79.6 CM/SEC
BH CV ECHO MEAS - LV V1 MEAN: 68.2 CM/SEC
BH CV ECHO MEAS - LV V1 VTI: 24.2 CM
BH CV ECHO MEAS - LVIDD: 3.2 CM
BH CV ECHO MEAS - LVIDS: 2.6 CM
BH CV ECHO MEAS - LVLD AP4: 5.5 CM
BH CV ECHO MEAS - LVLS AP4: 4.9 CM
BH CV ECHO MEAS - LVOT AREA (M): 2 CM^2
BH CV ECHO MEAS - LVOT AREA: 3 CM^2
BH CV ECHO MEAS - LVOT DIAM: 2 CM
BH CV ECHO MEAS - LVPWD: 1.3 CM
BH CV ECHO MEAS - MV A MAX VEL: 126 CM/SEC
BH CV ECHO MEAS - MV DEC SLOPE: 583.3 CM/SEC^2
BH CV ECHO MEAS - MV E MAX VEL: 156 CM/SEC
BH CV ECHO MEAS - MV E/A: 1.2
BH CV ECHO MEAS - MV MAX PG: 7.6 MMHG
BH CV ECHO MEAS - MV MEAN PG: 2 MMHG
BH CV ECHO MEAS - MV P1/2T MAX VEL: 140 CM/SEC
BH CV ECHO MEAS - MV P1/2T: 70.3 MSEC
BH CV ECHO MEAS - MV V2 MAX: 137.5 CM/SEC
BH CV ECHO MEAS - MV V2 MEAN: 59.9 CM/SEC
BH CV ECHO MEAS - MV V2 VTI: 47.5 CM
BH CV ECHO MEAS - MVA P1/2T LCG: 1.6 CM^2
BH CV ECHO MEAS - MVA(P1/2T): 3.1 CM^2
BH CV ECHO MEAS - MVA(VTI): 1.5 CM^2
BH CV ECHO MEAS - RVDD: 2.7 CM
BH CV ECHO MEAS - SI(AO): 318.8 ML/M^2
BH CV ECHO MEAS - SI(CUBED): 9.6 ML/M^2
BH CV ECHO MEAS - SI(LVOT): 44.4 ML/M^2
BH CV ECHO MEAS - SI(MOD-SP4): 19.8 ML/M^2
BH CV ECHO MEAS - SI(TEICH): 10.3 ML/M^2
BH CV ECHO MEAS - SV(AO): 528.4 ML
BH CV ECHO MEAS - SV(CUBED): 15.9 ML
BH CV ECHO MEAS - SV(LVOT): 73.5 ML
BH CV ECHO MEAS - SV(MOD-SP4): 32.8 ML
BH CV ECHO MEAS - SV(TEICH): 17.1 ML

## 2020-03-26 RX ORDER — MAGNESIUM OXIDE 400 MG/1
400 TABLET ORAL DAILY
Qty: 30 TABLET | Refills: 6 | Status: SHIPPED | OUTPATIENT
Start: 2020-03-26

## 2020-03-26 NOTE — PROGRESS NOTES
Echo shows prosthetic valve well seated. GEENA 1.2cm2.  EF okay. We will continue to monitor yearly.  Cont current

## 2020-06-24 ENCOUNTER — TELEPHONE (OUTPATIENT)
Dept: CARDIOLOGY | Facility: CLINIC | Age: 60
End: 2020-06-24

## 2020-06-24 NOTE — TELEPHONE ENCOUNTER
Patient called stating that she saw family doctor on Friday due to have a lot of shortness of breath when walking and being able to carry anything while walking. She states that she has had to increase oxygen to 3 L. She states that PCP did a chest xray and it showed that she has an enlarged heart and she needs to see our office, I have been trying to get through to PCP office to get chest xray sent over.       She did have labs on Friday and they did send over. I will have scanned in and let you know when they are in the chart.     Patient had stress and echo on 03/25/2020.

## 2020-06-24 NOTE — TELEPHONE ENCOUNTER
I forgot to mention, she has also had valve surgery in the past, with moderate AS noted which also contributes as well as HTN.  EF and valve areas were stable. (cardiomegaly was mild)

## 2020-06-24 NOTE — TELEPHONE ENCOUNTER
Keep current. I already spoke to Melinda about her. Xray is okay, I have reviewed, she has underlying lung issues which are contributing to cardiomegaly. All workup okay.  Keep current appt

## 2020-08-18 ENCOUNTER — TELEPHONE (OUTPATIENT)
Dept: CARDIOLOGY | Facility: CLINIC | Age: 60
End: 2020-08-18

## 2020-08-18 NOTE — TELEPHONE ENCOUNTER
Received fax from Dr. Nissen for cardiac clearance for patient to have surgical extractions for teeth number 7 through 10 and 23 through 26. Patient is on warfarin. According to our records, patient had an aortic valve replacement in 09/95. I do not see where patient had any stenting.       Fax [phone]

## 2020-08-18 NOTE — TELEPHONE ENCOUNTER
Cardiac clearance letter sent. Patient was made aware to get with PCP office regarding Lovenox bridging.

## 2020-09-01 ENCOUNTER — TELEPHONE (OUTPATIENT)
Dept: CARDIOLOGY | Facility: CLINIC | Age: 60
End: 2020-09-01

## 2020-09-01 DIAGNOSIS — Z95.2 S/P AVR: ICD-10-CM

## 2020-09-01 DIAGNOSIS — Z79.01 CURRENT USE OF LONG TERM ANTICOAGULATION: Primary | ICD-10-CM

## 2020-09-01 NOTE — TELEPHONE ENCOUNTER
Pt had called asking us to do Lovenox bridging, that PCP didn't monitor her Coumadin. I called and asked Dony Castellon's nurse if they had been monitoring pt's INR. She said they had not, that they had wrote a refill but they had thought we were monitoring. She discussed with Dony, he would like us to monitor. I called patient. She said no one had been monitoring routinely since Moises Thompson left. I advised her that we will be monitoring and that she needs to go to the lab in the morning. Will order Lovenox bridging per protocol.     Please put in standing order for our lab.

## 2020-09-02 ENCOUNTER — LAB (OUTPATIENT)
Dept: LAB | Facility: HOSPITAL | Age: 60
End: 2020-09-02

## 2020-09-02 DIAGNOSIS — Z95.2 S/P AVR: ICD-10-CM

## 2020-09-02 DIAGNOSIS — Z79.01 CURRENT USE OF LONG TERM ANTICOAGULATION: ICD-10-CM

## 2020-09-02 LAB
INR PPP: 1.88 (ref 0.9–1.1)
PROTHROMBIN TIME: 21.4 SECONDS (ref 11.9–14.1)

## 2020-09-02 PROCEDURE — 85610 PROTHROMBIN TIME: CPT | Performed by: NURSE PRACTITIONER

## 2020-09-02 PROCEDURE — 36415 COLL VENOUS BLD VENIPUNCTURE: CPT

## 2020-09-02 NOTE — TELEPHONE ENCOUNTER
Lovenox script sent to [facility], pt reports current weight as 150lbs. Pt picked up Lovenox bridging instructions.

## 2020-09-03 ENCOUNTER — ANTICOAGULATION VISIT (OUTPATIENT)
Dept: CARDIOLOGY | Facility: CLINIC | Age: 60
End: 2020-09-03

## 2020-09-03 NOTE — TELEPHONE ENCOUNTER
Pt aware to go ahead and start her Lovenox injections BID, to continue with the rest of the bridging instructions. Aware to restart Coumadin after procedure at 5 mg daily and recheck on Thursday.

## 2020-09-03 NOTE — TELEPHONE ENCOUNTER
After not being checked in Months, pt's INR is 1.88. She is to start holding her Coumadin today for the multiple extractions. Should pt go ahead and start the Lovenox today instead of waiting 2 days before starting?

## 2020-09-11 ENCOUNTER — TELEPHONE (OUTPATIENT)
Dept: CARDIOLOGY | Facility: CLINIC | Age: 60
End: 2020-09-11

## 2020-09-11 NOTE — TELEPHONE ENCOUNTER
Pt doing Lovenox bridging with her Coumadin, had extractions on 9/8, pt going back to lab on Monday, will continue both until then.

## 2020-09-14 ENCOUNTER — LAB (OUTPATIENT)
Dept: LAB | Facility: HOSPITAL | Age: 60
End: 2020-09-14

## 2020-09-14 DIAGNOSIS — Z95.2 S/P AVR: ICD-10-CM

## 2020-09-14 DIAGNOSIS — Z79.01 CURRENT USE OF LONG TERM ANTICOAGULATION: ICD-10-CM

## 2020-09-14 LAB
INR PPP: 2.04 (ref 0.9–1.1)
PROTHROMBIN TIME: 22.9 SECONDS (ref 11.9–14.1)

## 2020-09-14 PROCEDURE — 36415 COLL VENOUS BLD VENIPUNCTURE: CPT

## 2020-09-14 PROCEDURE — 85610 PROTHROMBIN TIME: CPT | Performed by: NURSE PRACTITIONER

## 2020-09-15 ENCOUNTER — ANTICOAGULATION VISIT (OUTPATIENT)
Dept: CARDIOLOGY | Facility: CLINIC | Age: 60
End: 2020-09-15

## 2020-09-17 ENCOUNTER — LAB (OUTPATIENT)
Dept: LAB | Facility: HOSPITAL | Age: 60
End: 2020-09-17

## 2020-09-17 ENCOUNTER — OFFICE VISIT (OUTPATIENT)
Dept: CARDIOLOGY | Facility: CLINIC | Age: 60
End: 2020-09-17

## 2020-09-17 VITALS
HEIGHT: 62 IN | WEIGHT: 145 LBS | SYSTOLIC BLOOD PRESSURE: 120 MMHG | BODY MASS INDEX: 26.68 KG/M2 | DIASTOLIC BLOOD PRESSURE: 70 MMHG | TEMPERATURE: 99 F | HEART RATE: 68 BPM

## 2020-09-17 DIAGNOSIS — Z79.01 CURRENT USE OF LONG TERM ANTICOAGULATION: ICD-10-CM

## 2020-09-17 DIAGNOSIS — E66.3 OVERWEIGHT: ICD-10-CM

## 2020-09-17 DIAGNOSIS — R06.02 SHORTNESS OF BREATH: ICD-10-CM

## 2020-09-17 DIAGNOSIS — I10 ESSENTIAL HYPERTENSION: ICD-10-CM

## 2020-09-17 DIAGNOSIS — Z99.81 ON SUPPLEMENTAL OXYGEN THERAPY: ICD-10-CM

## 2020-09-17 DIAGNOSIS — E78.2 MIXED HYPERLIPIDEMIA: ICD-10-CM

## 2020-09-17 DIAGNOSIS — J43.8 OTHER EMPHYSEMA (HCC): ICD-10-CM

## 2020-09-17 DIAGNOSIS — R01.1 HEART MURMUR: ICD-10-CM

## 2020-09-17 DIAGNOSIS — Z95.2 S/P AVR: Primary | ICD-10-CM

## 2020-09-17 DIAGNOSIS — Z95.2 S/P AVR: ICD-10-CM

## 2020-09-17 DIAGNOSIS — I71.20 THORACIC AORTIC ANEURYSM, WITHOUT RUPTURE: ICD-10-CM

## 2020-09-17 LAB
INR PPP: 2.56 (ref 0.9–1.1)
PROTHROMBIN TIME: 27.4 SECONDS (ref 11.9–14.1)

## 2020-09-17 PROCEDURE — 99214 OFFICE O/P EST MOD 30 MIN: CPT | Performed by: NURSE PRACTITIONER

## 2020-09-17 PROCEDURE — 85610 PROTHROMBIN TIME: CPT | Performed by: NURSE PRACTITIONER

## 2020-09-17 PROCEDURE — 36415 COLL VENOUS BLD VENIPUNCTURE: CPT

## 2020-09-17 RX ORDER — WARFARIN SODIUM 5 MG/1
5 TABLET ORAL
Qty: 30 TABLET | Refills: 8 | Status: SHIPPED | OUTPATIENT
Start: 2020-09-17 | End: 2021-06-15

## 2020-09-17 RX ORDER — ESCITALOPRAM OXALATE 20 MG/1
20 TABLET ORAL DAILY
COMMUNITY

## 2020-09-17 NOTE — PROGRESS NOTES
Chief Complaint   Patient presents with   • Follow-up     For cardiac management. Patient is not on aspirin. Patient is on warfarin which is managed by our office. Last lab work was done a few months ago per PCP, not in chart. States that she has shortness of breath, does follow with Dr. Wilkins. States she was told she has emphysema.    • Med Refill     Needs refills on warfarin. 30 day supplies to [facility]. Brought medication list with visit.        Cardiac Complaints  dyspnea      Subjective   Estefany Castañeda is a 59 y.o. female with hypertension, hyperlipidemia, diabetes, COPD with remote tobacco use, and bicuspid aortic valve, S/P aortic valve replacement in 1995.  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  She was referred in May 2016 for chest pain and shortness of breath.  Cardiac workup done at that time showed no ischemia, normal LV function, and an aortic valve replacement that was well seated and functioning properly.  In December 2016, PCP called the office to report symptoms including chest pain. Repeat echo was done and no changes noted. She continues to be managed medically. She is anticoagulated with warfarin managed by PCP. She returned in May 2019 complaining of chest pain and shortness of breath, repeat cardiac workup was recommended. Stress and echo advised showed no ischemia, good LV function, and moderate AS with GEENA of 1.4, valve well seated and functioning properly. At last visit, she continued to have SOA and repeat echo advised. Most recent echo from 3/2020 showed GEENA at 1.2cm2 and EF of 60%.  She did have concerns over AAA and US was advised, no AAA noted.  Most recent low density CT of the chest showed ascending aneurysm of 4.1cm.     She comes today for follow up and reports doing well. She does report tooth extraction in 09/08 and admits she was on lovenox bridging since then.   She has to have INR rechecked today. She does continue to have shortness of breath but nothing that is of concern for her as she states it has remained at baseline. Patient reports it is related to her pulmonary concerns and admits she remains followed by pulmonary in regards. She is now on 3LNC vs 2LNC.  Refills of coumadin requested.     Cardiac History  Past Surgical History:   Procedure Laterality Date   • AORTIC VALVE REPAIR/REPLACEMENT  09/1995    [facility]    • CARDIAC CATHETERIZATION  07/18/2009    () Normal Coronaries. Acute takeoff of the LM.    • CARDIOVASCULAR STRESS TEST  05/19/2016    EF 65%, no ischemia   • CARDIOVASCULAR STRESS TEST  05/16/2019    L.Cardiolite- EF 65%. Negative.   • ECHO - CONVERTED  07/20/2009    () Normal Coronaries. Acute takeoff of the LM.    • ECHO - CONVERTED  05/19/2016    EF 50-55%, mod AS, AVR well seated and well functioning   • ECHO - CONVERTED  12/08/2016    EF 55-60%. GEENA- 1.5 Cm2. Gr- 34 mmHg. RVSP 23 mmHg   • ECHO - CONVERTED  05/16/2019    EF 65%. AVR. GEENA- 1.4 Cm2. Gr- 25 mmHg. Mild MR. RVSP- 24 mmHg.   • ECHO - CONVERTED  03/25/2020    EF 60%. AVR. GEENA- 1.2 Cm2. 17/29 mmHg. Mild MR & AI.   • US CAROTID UNILATERAL  11/17/2016    bilateral- no hemodynamically significant stenosis       Current Outpatient Medications   Medication Sig Dispense Refill   • Albuterol Sulfate (PROAIR HFA IN) Inhale As Needed.     • cholecalciferol (VITAMIN D3) 1000 units tablet Take 1,000 Units by mouth Daily.     • diphenhydrAMINE (DIPHENHIST) 25 MG tablet Take 25 mg by mouth 2 (Two) Times a Day.     • escitalopram (LEXAPRO) 20 MG tablet Take 20 mg by mouth Daily.     • esomeprazole (nexIUM) 40 MG capsule Take 40 mg by mouth Every Morning Before Breakfast.     • fenofibrate 160 MG tablet Take 160 mg by mouth Daily.     • Fluticasone-Umeclidin-Vilant (Trelegy Ellipta) 100-62.5-25 MCG/INH aerosol powder  Inhale 1 puff.     • gabapentin (NEURONTIN) 800 MG tablet Take 800 mg by  mouth 3 (Three) Times a Day.     • guaiFENesin (MUCINEX) 600 MG 12 hr tablet Take 600 mg by mouth 2 (Two) Times a Day As Needed.     • insulin aspart (novoLOG) 100 UNIT/ML injection Inject  under the skin 3 (Three) Times a Day Before Meals. Per sliding scale     • Insulin Glargine (BASAGLAR KWIKPEN SC) Inject  under the skin into the appropriate area as directed Every Night.     • irbesartan (AVAPRO) 150 MG tablet Take 150 mg by mouth Daily.     • magnesium oxide (MAG-OX) 400 MG tablet Take 1 tablet by mouth Daily. 30 tablet 6   • metoprolol tartrate (LOPRESSOR) 25 MG tablet Take 1 tablet by mouth 2 (Two) Times a Day. 60 tablet 11   • Multiple Vitamins-Minerals (CENTRUM SILVER PO) Take  by mouth Daily.     • O2 (OXYGEN) Inhale 3 L/min Daily.     • pravastatin (PRAVACHOL) 20 MG tablet Take 20 mg by mouth Daily.     • SITagliptin (JANUVIA) 100 MG tablet Take 100 mg by mouth Daily.     • tiZANidine (ZANAFLEX) 4 MG tablet Take 4 mg by mouth Every Night.     • traZODone (DESYREL) 50 MG tablet Take 50 mg by mouth Every Night.     • warfarin (COUMADIN) 5 MG tablet Take 1 tablet by mouth Daily. 30 tablet 8     No current facility-administered medications for this visit.        Capsaicin, Crestor [rosuvastatin calcium], Demerol [meperidine], Ibuprofen, and Lipitor [atorvastatin]    Past Medical History:   Diagnosis Date   • Aneurysm (CMS/HCC)      clipping   • Anxiety    • COPD (chronic obstructive pulmonary disease) (CMS/HCC)    • CVA (cerebral vascular accident) (CMS/HCC)    • Depression    • Diabetes mellitus (CMS/HCC)    • GERD (gastroesophageal reflux disease)    • Hx of appendectomy 12/2017   • Hypercholesterolemia    • Hypertension    • Mechanical heart valve present        Social History     Socioeconomic History   • Marital status:      Spouse name: Not on file   • Number of children: Not on file   • Years of education: Not on file   • Highest education level: Not on file   Tobacco Use   • Smoking status:  "Former Smoker     Quit date:      Years since quittin.7   • Smokeless tobacco: Never Used   • Tobacco comment: Quit 6 years ago   Substance and Sexual Activity   • Alcohol use: Not Currently   • Drug use: No       Family History   Problem Relation Age of Onset   • COPD Mother        Review of Systems   Constitution: Negative for malaise/fatigue and night sweats.   Cardiovascular: Positive for dyspnea on exertion. Negative for chest pain, claudication, irregular heartbeat, leg swelling, near-syncope, orthopnea, palpitations and syncope.   Respiratory: Positive for shortness of breath. Negative for cough and wheezing.    Musculoskeletal: Negative for back pain, joint pain and stiffness.   Gastrointestinal: Negative for anorexia, heartburn, nausea and vomiting.   Genitourinary: Negative for dysuria, hematuria, hesitancy and nocturia.   Neurological: Negative for dizziness, light-headedness and loss of balance.   Psychiatric/Behavioral: Negative for depression and memory loss. The patient is not nervous/anxious.            Objective     /70 (BP Location: Right arm)   Pulse 68   Temp 99 °F (37.2 °C)   Ht 157 cm (61.81\")   Wt 65.8 kg (145 lb)   BMI 26.68 kg/m²     Constitutional:       Appearance: Healthy appearance. Frail.   Eyes:      Pupils: Pupils are equal, round, and reactive to light.   Neck:      Musculoskeletal: Neck supple.   Pulmonary:      Effort: Pulmonary effort is normal.      Breath sounds: Decreased breath sounds present.   Cardiovascular:      PMI at left midclavicular line. Normal rate. Regular rhythm.      Murmurs: There is a harsh midsystolic murmur at the URSB, radiating to the neck.   Abdominal:      Palpations: Abdomen is soft.   Musculoskeletal: Normal range of motion.   Skin:     General: Skin is warm and dry.   Neurological:      Mental Status: Oriented to person, place and time.         Procedures    Assessment/Plan     HTN:  Blood pressure stable on current. She will remain " on current avapro and lopressor therapy.      Cardiac status:  Most recent cardiac workup negative for ischemia. New symptoms denied.  No repeat workup recommended.     Murmur/AVR:  Most recent echo showed GEENA 1.2cm2, valve well seated. Repeat echo to be done in the spring.  Murmur no louder than prior.  Remains on coumadin therapy for anticoagulation, PT/INR with our office.      Hyperlipidemia:  Remains on statin and fenofibrate therapy and tolerates well. Side effects denied. FLP followed by your office, can we have for review?      SOA/COPD:  Continues to use supplemental oxygen therapy. Continues to be smoke free. Followed by pulmonary. Ascending aneurysm found on recent CT.  We will monitor yearly. Size 4.1cm on recent scan.    DM:  Insulin dependent. Followed by your office. She states AIC to be checked next visit. Can we have for our review?      BMI noted at 26.68, good cardiac ADA diet with limited carbs/calories and activity as tolerated.      Coumadin sent per request.    6 month follow up recommended or sooner if needed.         Estefany was seen today for follow-up and med refill.    Diagnoses and all orders for this visit:    S/P AVR    Heart murmur    Essential hypertension    Mixed hyperlipidemia    Shortness of breath    Other emphysema (CMS/HCC)    Thoracic aortic aneurysm, without rupture (CMS/HCC)    Overweight    On supplemental oxygen therapy    Other orders  -     warfarin (COUMADIN) 5 MG tablet; Take 1 tablet by mouth Daily.        Patient's Body mass index is 26.68 kg/m². BMI is above normal parameters. Recommendations include: nutrition counseling.                Electronically signed by CHELSIE Chopra September 18, 2020 11:00 EDT

## 2020-09-18 ENCOUNTER — ANTICOAGULATION VISIT (OUTPATIENT)
Dept: CARDIOLOGY | Facility: CLINIC | Age: 60
End: 2020-09-18

## 2020-09-29 ENCOUNTER — LAB (OUTPATIENT)
Dept: LAB | Facility: HOSPITAL | Age: 60
End: 2020-09-29

## 2020-09-29 DIAGNOSIS — Z79.01 CURRENT USE OF LONG TERM ANTICOAGULATION: ICD-10-CM

## 2020-09-29 DIAGNOSIS — Z95.2 S/P AVR: ICD-10-CM

## 2020-09-29 LAB
INR PPP: 3.88 (ref 0.9–1.1)
PROTHROMBIN TIME: 38.1 SECONDS (ref 11.9–14.1)

## 2020-09-29 PROCEDURE — 36415 COLL VENOUS BLD VENIPUNCTURE: CPT

## 2020-09-29 PROCEDURE — 85610 PROTHROMBIN TIME: CPT | Performed by: NURSE PRACTITIONER

## 2020-09-30 ENCOUNTER — ANTICOAGULATION VISIT (OUTPATIENT)
Dept: CARDIOLOGY | Facility: CLINIC | Age: 60
End: 2020-09-30

## 2020-10-12 ENCOUNTER — LAB (OUTPATIENT)
Dept: LAB | Facility: HOSPITAL | Age: 60
End: 2020-10-12

## 2020-10-12 DIAGNOSIS — Z95.2 S/P AVR: ICD-10-CM

## 2020-10-12 DIAGNOSIS — Z79.01 CURRENT USE OF LONG TERM ANTICOAGULATION: ICD-10-CM

## 2020-10-12 LAB
INR PPP: 7.03 (ref 0.9–1.1)
PROTHROMBIN TIME: 61.1 SECONDS (ref 11.9–14.1)

## 2020-10-12 PROCEDURE — 85610 PROTHROMBIN TIME: CPT | Performed by: NURSE PRACTITIONER

## 2020-10-12 PROCEDURE — 36415 COLL VENOUS BLD VENIPUNCTURE: CPT

## 2020-10-13 ENCOUNTER — TELEPHONE (OUTPATIENT)
Dept: CARDIOLOGY | Facility: CLINIC | Age: 60
End: 2020-10-13

## 2020-10-19 ENCOUNTER — LAB (OUTPATIENT)
Dept: LAB | Facility: HOSPITAL | Age: 60
End: 2020-10-19

## 2020-10-19 ENCOUNTER — TELEPHONE (OUTPATIENT)
Dept: CARDIOLOGY | Facility: CLINIC | Age: 60
End: 2020-10-19

## 2020-10-19 DIAGNOSIS — Z95.2 S/P AVR: ICD-10-CM

## 2020-10-19 DIAGNOSIS — Z79.01 CURRENT USE OF LONG TERM ANTICOAGULATION: ICD-10-CM

## 2020-10-19 LAB
INR PPP: 1.44
INR PPP: 1.44 (ref 0.9–1.1)
PROTHROMBIN TIME: 17.4 SECONDS (ref 11.9–14.1)

## 2020-10-19 PROCEDURE — 85610 PROTHROMBIN TIME: CPT | Performed by: NURSE PRACTITIONER

## 2020-10-19 PROCEDURE — 36415 COLL VENOUS BLD VENIPUNCTURE: CPT

## 2020-10-19 NOTE — TELEPHONE ENCOUNTER
I had called and left message with patient over the weekend to check on her. Was suppose to go to lab on Thursday but had not had any results. Pt reports being sick last week. Went back to lab today.   
Yes, Non-Core measure site...

## 2020-10-20 ENCOUNTER — ANTICOAGULATION VISIT (OUTPATIENT)
Dept: CARDIOLOGY | Facility: CLINIC | Age: 60
End: 2020-10-20

## 2020-10-21 ENCOUNTER — ANTICOAGULATION VISIT (OUTPATIENT)
Dept: CARDIOLOGY | Facility: CLINIC | Age: 60
End: 2020-10-21

## 2020-10-26 ENCOUNTER — LAB (OUTPATIENT)
Dept: LAB | Facility: HOSPITAL | Age: 60
End: 2020-10-26

## 2020-10-26 ENCOUNTER — ANTICOAGULATION VISIT (OUTPATIENT)
Dept: CARDIOLOGY | Facility: CLINIC | Age: 60
End: 2020-10-26

## 2020-10-26 DIAGNOSIS — Z79.01 CURRENT USE OF LONG TERM ANTICOAGULATION: ICD-10-CM

## 2020-10-26 DIAGNOSIS — Z95.2 S/P AVR: ICD-10-CM

## 2020-10-26 LAB
INR PPP: 3.64 (ref 0.9–1.1)
PROTHROMBIN TIME: 36.2 SECONDS (ref 11.9–14.1)

## 2020-10-26 PROCEDURE — 36415 COLL VENOUS BLD VENIPUNCTURE: CPT

## 2020-10-26 PROCEDURE — 85610 PROTHROMBIN TIME: CPT | Performed by: NURSE PRACTITIONER

## 2020-11-05 ENCOUNTER — LAB (OUTPATIENT)
Dept: LAB | Facility: HOSPITAL | Age: 60
End: 2020-11-05

## 2020-11-05 DIAGNOSIS — Z79.01 CURRENT USE OF LONG TERM ANTICOAGULATION: ICD-10-CM

## 2020-11-05 DIAGNOSIS — Z95.2 S/P AVR: ICD-10-CM

## 2020-11-05 LAB
INR PPP: 2.62 (ref 0.9–1.1)
PROTHROMBIN TIME: 28 SECONDS (ref 11.9–14.1)

## 2020-11-05 PROCEDURE — 36415 COLL VENOUS BLD VENIPUNCTURE: CPT

## 2020-11-05 PROCEDURE — 85610 PROTHROMBIN TIME: CPT | Performed by: NURSE PRACTITIONER

## 2020-11-06 ENCOUNTER — ANTICOAGULATION VISIT (OUTPATIENT)
Dept: CARDIOLOGY | Facility: CLINIC | Age: 60
End: 2020-11-06

## 2020-12-17 ENCOUNTER — TELEPHONE (OUTPATIENT)
Dept: CARDIOLOGY | Facility: CLINIC | Age: 60
End: 2020-12-17

## 2020-12-22 ENCOUNTER — LAB (OUTPATIENT)
Dept: LAB | Facility: HOSPITAL | Age: 60
End: 2020-12-22

## 2020-12-22 DIAGNOSIS — Z95.2 S/P AVR: ICD-10-CM

## 2020-12-22 DIAGNOSIS — Z79.01 CURRENT USE OF LONG TERM ANTICOAGULATION: ICD-10-CM

## 2020-12-22 LAB
INR PPP: 2.19 (ref 0.9–1.1)
PROTHROMBIN TIME: 24.2 SECONDS (ref 11.9–14.1)

## 2020-12-22 PROCEDURE — 85610 PROTHROMBIN TIME: CPT

## 2020-12-22 PROCEDURE — 36415 COLL VENOUS BLD VENIPUNCTURE: CPT

## 2020-12-23 ENCOUNTER — ANTICOAGULATION VISIT (OUTPATIENT)
Dept: CARDIOLOGY | Facility: CLINIC | Age: 60
End: 2020-12-23

## 2021-01-07 ENCOUNTER — LAB (OUTPATIENT)
Dept: LAB | Facility: HOSPITAL | Age: 61
End: 2021-01-07

## 2021-01-07 DIAGNOSIS — Z95.2 S/P AVR: ICD-10-CM

## 2021-01-07 DIAGNOSIS — Z79.01 CURRENT USE OF LONG TERM ANTICOAGULATION: ICD-10-CM

## 2021-01-07 LAB
INR PPP: 3.06 (ref 0.9–1.1)
PROTHROMBIN TIME: 31.6 SECONDS (ref 11.9–14.1)

## 2021-01-07 PROCEDURE — 85610 PROTHROMBIN TIME: CPT

## 2021-01-07 PROCEDURE — 36415 COLL VENOUS BLD VENIPUNCTURE: CPT

## 2021-01-08 ENCOUNTER — ANTICOAGULATION VISIT (OUTPATIENT)
Dept: CARDIOLOGY | Facility: CLINIC | Age: 61
End: 2021-01-08

## 2021-02-23 ENCOUNTER — LAB (OUTPATIENT)
Dept: LAB | Facility: HOSPITAL | Age: 61
End: 2021-02-23

## 2021-02-23 DIAGNOSIS — Z79.01 CURRENT USE OF LONG TERM ANTICOAGULATION: ICD-10-CM

## 2021-02-23 DIAGNOSIS — Z95.2 S/P AVR: ICD-10-CM

## 2021-02-23 LAB
INR PPP: 3.38 (ref 0.9–1.1)
PROTHROMBIN TIME: 34.2 SECONDS (ref 11.9–14.1)

## 2021-02-23 PROCEDURE — 36415 COLL VENOUS BLD VENIPUNCTURE: CPT

## 2021-02-23 PROCEDURE — 85610 PROTHROMBIN TIME: CPT

## 2021-02-24 ENCOUNTER — ANTICOAGULATION VISIT (OUTPATIENT)
Dept: CARDIOLOGY | Facility: CLINIC | Age: 61
End: 2021-02-24

## 2021-03-22 ENCOUNTER — OFFICE VISIT (OUTPATIENT)
Dept: CARDIOLOGY | Facility: CLINIC | Age: 61
End: 2021-03-22

## 2021-03-22 VITALS
HEART RATE: 94 BPM | SYSTOLIC BLOOD PRESSURE: 148 MMHG | WEIGHT: 149 LBS | HEIGHT: 62 IN | TEMPERATURE: 98.2 F | DIASTOLIC BLOOD PRESSURE: 84 MMHG | BODY MASS INDEX: 27.42 KG/M2

## 2021-03-22 DIAGNOSIS — I20.8 ANGINAL EQUIVALENT (HCC): ICD-10-CM

## 2021-03-22 DIAGNOSIS — I49.3 PVC (PREMATURE VENTRICULAR CONTRACTION): ICD-10-CM

## 2021-03-22 DIAGNOSIS — Z87.891 FORMER SMOKER: ICD-10-CM

## 2021-03-22 DIAGNOSIS — E78.2 MIXED HYPERLIPIDEMIA: ICD-10-CM

## 2021-03-22 DIAGNOSIS — Z79.4 TYPE 2 DIABETES MELLITUS WITH COMPLICATION, WITH LONG-TERM CURRENT USE OF INSULIN (HCC): ICD-10-CM

## 2021-03-22 DIAGNOSIS — I10 ESSENTIAL HYPERTENSION: ICD-10-CM

## 2021-03-22 DIAGNOSIS — I71.10 THORACIC ANEURYSM, RUPTURED (HCC): Primary | ICD-10-CM

## 2021-03-22 DIAGNOSIS — I35.0 AORTIC STENOSIS, MODERATE: ICD-10-CM

## 2021-03-22 DIAGNOSIS — Z95.2 S/P AVR: ICD-10-CM

## 2021-03-22 DIAGNOSIS — J44.9 COPD MIXED TYPE (HCC): ICD-10-CM

## 2021-03-22 DIAGNOSIS — E11.8 TYPE 2 DIABETES MELLITUS WITH COMPLICATION, WITH LONG-TERM CURRENT USE OF INSULIN (HCC): ICD-10-CM

## 2021-03-22 DIAGNOSIS — E66.3 OVERWEIGHT: ICD-10-CM

## 2021-03-22 DIAGNOSIS — R00.2 PALPITATIONS: ICD-10-CM

## 2021-03-22 DIAGNOSIS — R06.02 SHORTNESS OF BREATH: ICD-10-CM

## 2021-03-22 DIAGNOSIS — R01.1 HEART MURMUR: ICD-10-CM

## 2021-03-22 PROCEDURE — 99214 OFFICE O/P EST MOD 30 MIN: CPT | Performed by: NURSE PRACTITIONER

## 2021-03-22 PROCEDURE — 93000 ELECTROCARDIOGRAM COMPLETE: CPT | Performed by: NURSE PRACTITIONER

## 2021-03-22 RX ORDER — OXYBUTYNIN CHLORIDE 10 MG/1
10 TABLET, EXTENDED RELEASE ORAL DAILY
COMMUNITY
End: 2022-04-21

## 2021-03-22 RX ORDER — METOPROLOL TARTRATE 50 MG/1
50 TABLET, FILM COATED ORAL 2 TIMES DAILY
Qty: 180 TABLET | Refills: 3 | Status: SHIPPED | OUTPATIENT
Start: 2021-03-22 | End: 2022-03-11

## 2021-03-22 NOTE — PROGRESS NOTES
Chief Complaint   Patient presents with   • Follow-up     cardiac management. pt is not on aspirin.    • Med Refill     will need cardiac meds refilled 90 days to [facility] in [place]. Brought med list with her.   • Labs     last month with . not in chart.       Cardiac Complaints  none      Subjective   Estefany Castañeda is a 60 y.o. female with hypertension, hyperlipidemia, diabetes, COPD with remote tobacco use, and bicuspid aortic valve, S/P aortic valve replacement in 1995.  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  She was referred in May 2016 for chest pain and shortness of breath. Cardiac workup done at that time showed no ischemia, normal LV function, and an aortic valve replacement that was well seated and functioning properly.  In December 2016, PCP called the office to report symptoms including chest pain. Repeat echo was done and no changes noted. She continues to be managed medically. She is anticoagulated with warfarin managed by PCP. She returned in May 2019 complaining of chest pain and shortness of breath, repeat cardiac workup was recommended. Stress and echo advised showed no ischemia, good LV function, and moderate AS with GEENA of 1.4, valve well seated and functioning properly. At last visit, she continued to have SOA and repeat echo advised. Most recent echo from 3/2020 showed GEENA at 1.2cm2 and EF of 60%.  She did have concerns over AAA and US was advised, no AAA noted.  Most recent low density CT of the chest showed ascending aneurysm of 4.1cm.     She returns today for follow up and denies any new concerns. Chest pain, palpitations, dizziness, and syncope denied. SOA persists but not any worse than prior, patient continues use of SL NTG. Labs she admits have been done with PCP and pulmonary, no current available. She has continued with coumadin therapy and tolerates well, bleeding and  bruising denied. Refills requested.             Cardiac History  Past Surgical History:   Procedure Laterality Date   • AORTIC VALVE REPAIR/REPLACEMENT  09/1995    [facility]    • CARDIAC CATHETERIZATION  07/18/2009    () Normal Coronaries. Acute takeoff of the LM.    • CARDIOVASCULAR STRESS TEST  05/19/2016    EF 65%, no ischemia   • CARDIOVASCULAR STRESS TEST  05/16/2019    L.Cardiolite- EF 65%. Negative.   • ECHO - CONVERTED  07/20/2009    () Normal Coronaries. Acute takeoff of the LM.    • ECHO - CONVERTED  05/19/2016    EF 50-55%, mod AS, AVR well seated and well functioning   • ECHO - CONVERTED  12/08/2016    EF 55-60%. GEENA- 1.5 Cm2. Gr- 34 mmHg. RVSP 23 mmHg   • ECHO - CONVERTED  05/16/2019    EF 65%. AVR. GEENA- 1.4 Cm2. Gr- 25 mmHg. Mild MR. RVSP- 24 mmHg.   • ECHO - CONVERTED  03/25/2020    EF 60%. AVR. GEENA- 1.2 Cm2. 17/29 mmHg. Mild MR & AI.   • US CAROTID UNILATERAL  11/17/2016    bilateral- no hemodynamically significant stenosis       Current Outpatient Medications   Medication Sig Dispense Refill   • Albuterol Sulfate (PROAIR HFA IN) Inhale As Needed.     • cholecalciferol (VITAMIN D3) 1000 units tablet Take 1,000 Units by mouth Daily.     • diphenhydrAMINE (DIPHENHIST) 25 MG tablet Take 25 mg by mouth 2 (Two) Times a Day.     • escitalopram (LEXAPRO) 20 MG tablet Take 20 mg by mouth Daily.     • esomeprazole (nexIUM) 40 MG capsule Take 40 mg by mouth Every Morning Before Breakfast.     • fenofibrate 160 MG tablet Take 160 mg by mouth Daily.     • Fluticasone-Umeclidin-Vilant (Trelegy Ellipta) 100-62.5-25 MCG/INH aerosol powder  Inhale 1 puff.     • gabapentin (NEURONTIN) 800 MG tablet Take 800 mg by mouth 3 (Three) Times a Day.     • guaiFENesin (MUCINEX) 600 MG 12 hr tablet Take 600 mg by mouth 2 (Two) Times a Day As Needed.     • insulin aspart (novoLOG) 100 UNIT/ML injection Inject  under the skin 3 (Three) Times a Day Before Meals. Per sliding scale     • Insulin Glargine (BASAGLAR  KWIKPEN SC) Inject  under the skin into the appropriate area as directed Every Night.     • irbesartan (AVAPRO) 150 MG tablet Take 150 mg by mouth Daily.     • magnesium oxide (MAG-OX) 400 MG tablet Take 1 tablet by mouth Daily. 30 tablet 6   • Multiple Vitamins-Minerals (CENTRUM SILVER PO) Take  by mouth Daily.     • O2 (OXYGEN) Inhale 3 L/min Daily.     • oxybutynin XL (DITROPAN-XL) 10 MG 24 hr tablet Take 10 mg by mouth Daily.     • pravastatin (PRAVACHOL) 20 MG tablet Take 20 mg by mouth Daily.     • tiZANidine (ZANAFLEX) 4 MG tablet Take 4 mg by mouth Every Night.     • traZODone (DESYREL) 50 MG tablet Take 50 mg by mouth Every Night.     • warfarin (COUMADIN) 5 MG tablet Take 1 tablet by mouth Daily. 30 tablet 8   • metoprolol tartrate (LOPRESSOR) 50 MG tablet Take 1 tablet by mouth 2 (Two) Times a Day. 180 tablet 3     No current facility-administered medications for this visit.       Capsaicin, Crestor [rosuvastatin calcium], Demerol [meperidine], Ibuprofen, and Lipitor [atorvastatin]    Past Medical History:   Diagnosis Date   • Aneurysm (CMS/HCC)      clipping   • Anxiety    • COPD (chronic obstructive pulmonary disease) (CMS/HCC)    • CVA (cerebral vascular accident) (CMS/HCC)    • Depression    • Diabetes mellitus (CMS/HCC)    • GERD (gastroesophageal reflux disease)    • Hx of appendectomy 2017   • Hypercholesterolemia    • Hypertension    • Mechanical heart valve present        Social History     Socioeconomic History   • Marital status:      Spouse name: Not on file   • Number of children: Not on file   • Years of education: Not on file   • Highest education level: Not on file   Tobacco Use   • Smoking status: Former Smoker     Quit date:      Years since quittin.2   • Smokeless tobacco: Never Used   • Tobacco comment: Quit 6 years ago   Vaping Use   • Vaping Use: Never used   Substance and Sexual Activity   • Alcohol use: Not Currently   • Drug use: No       Family History  "  Problem Relation Age of Onset   • COPD Mother        Review of Systems   Constitutional: Negative for malaise/fatigue and night sweats.   Cardiovascular: Positive for dyspnea on exertion. Negative for chest pain, claudication, irregular heartbeat, leg swelling, near-syncope, orthopnea, palpitations and syncope.   Respiratory: Positive for shortness of breath. Negative for cough and wheezing.    Musculoskeletal: Positive for stiffness. Negative for back pain and joint pain.   Gastrointestinal: Negative for anorexia, heartburn, melena, nausea and vomiting.   Genitourinary: Negative for dysuria, hematuria, hesitancy and nocturia.   Neurological: Negative for dizziness, light-headedness and loss of balance.   Psychiatric/Behavioral: Negative for depression and memory loss. The patient is not nervous/anxious.            Objective     /84 (BP Location: Left arm)   Pulse 94   Temp 98.2 °F (36.8 °C)   Ht 157 cm (61.81\")   Wt 67.6 kg (149 lb)   BMI 27.42 kg/m²     Constitutional:       Appearance: Healthy appearance. Not in distress.   Eyes:      Pupils: Pupils are equal, round, and reactive to light.   HENT:      Nose: Nose normal.   Pulmonary:      Effort: Pulmonary effort is normal.      Breath sounds: Normal breath sounds.   Cardiovascular:      PMI at left midclavicular line. Normal rate. Regular rhythm.      Murmurs: There is a systolic murmur.   Abdominal:      Palpations: Abdomen is soft.   Musculoskeletal: Normal range of motion.      Cervical back: Normal range of motion and neck supple. Skin:     General: Skin is warm and dry.   Neurological:      Mental Status: Oriented to person, place and time.           ECG 12 Lead    Date/Time: 3/22/2021 3:57 PM  Performed by: Azeb Givens APRN  Authorized by: Azeb Givens APRN   Comparison: compared with previous ECG from 3/17/2020  Similar to previous ECG  Comparison to previous ECG: Rate higher  Rhythm: sinus rhythm  Ectopy: infrequent PVCs  BPM: " 94    Clinical impression: abnormal EKG  Comments: Normal QT            Assessment/Plan     HTN:  Blood pressure elevated today, but at home it has been well managed.  No recent med list is available, patient can not recite but thinks on avapro and metoprolol. If on metoprolol 25mg BID, she will increase to 50mg BID.   Cardiac status:  Most recent cardiac workup negative for ischemia. Repeat stress testing recommended to assess for any silent myocardial ischemia. More recommendations to follow.    Irregular pulse:  EKG done today in regards shows a NSR with PVC and RBBB along with normal QT.  Toprol therapy to be increased to 50mg BID, patient to check dosing at home and drop updated med list by the office. Limited caffeine advised.     Murmur/AVR:  Most recent echo showed GEENA 1.2cm2, valve well seated. Repeat echo advised to reassess.  Remains on coumadin therapy for anticoagulation, PT/INR with our office.     Hyperlipidemia:  Remains on statin and fenofibrate therapy  ?? and tolerates well. Side effects denied. FLP followed by your office, can we have for review?      SOA/COPD:  Continues to use supplemental oxygen therapy. Continues to be smoke free. Followed by pulmonary.     Ascending aneurysm:  Repeat CT of the chest recommended to assess size of thoracic aneurysm. Yearly checks to be continued.     DM:  Insulin dependent. Followed by your office. She states AIC to be checked next visit. Can we have for our review?      BMI noted at 26.68, good cardiac ADA diet with limited carbs/calories and activity as tolerated.      Coumadin sent per request.     6 month follow up recommended or sooner if needed.         Problems Addressed this Visit        Cardiac and Vasculature    S/P AVR    Hyperlipemia    Essential hypertension    Relevant Medications    metoprolol tartrate (LOPRESSOR) 50 MG tablet       Endocrine and Metabolic    Type 2 diabetes mellitus with complication, with long-term current use of insulin  (CMS/[facility])       Pulmonary and Pneumonias    COPD mixed type (CMS/[facility])      Other Visit Diagnoses     Thoracic aneurysm, ruptured (CMS/[facility])    -  Primary    Relevant Orders    CT Chest With & Without Contrast Diagnostic    Heart murmur        Relevant Orders    Adult Transthoracic Echo Complete W/ Cont if Necessary Per Protocol    Stress Test With Myocardial Perfusion One Day    Aortic stenosis, moderate        Relevant Medications    metoprolol tartrate (LOPRESSOR) 50 MG tablet    Other Relevant Orders    Adult Transthoracic Echo Complete W/ Cont if Necessary Per Protocol    Stress Test With Myocardial Perfusion One Day    Anginal equivalent (CMS/[facility])        Relevant Medications    metoprolol tartrate (LOPRESSOR) 50 MG tablet    Other Relevant Orders    Stress Test With Myocardial Perfusion One Day    Shortness of breath        Relevant Orders    Stress Test With Myocardial Perfusion One Day    PVC (premature ventricular contraction)        Relevant Medications    metoprolol tartrate (LOPRESSOR) 50 MG tablet    Palpitations        Overweight        Former smoker          Diagnoses       Codes Comments    Thoracic aneurysm, ruptured (CMS/[facility])    -  Primary ICD-10-CM: I71.1  ICD-9-CM: 441.1     Heart murmur     ICD-10-CM: R01.1  ICD-9-CM: 785.2     Aortic stenosis, moderate     ICD-10-CM: I35.0  ICD-9-CM: 424.1     Anginal equivalent (CMS/[facility])     ICD-10-CM: I20.8  ICD-9-CM: 413.9     Shortness of breath     ICD-10-CM: R06.02  ICD-9-CM: 786.05     PVC (premature ventricular contraction)     ICD-10-CM: I49.3  ICD-9-CM: 427.69     Essential hypertension     ICD-10-CM: I10  ICD-9-CM: 401.9     Palpitations     ICD-10-CM: R00.2  ICD-9-CM: 785.1     S/P AVR     ICD-10-CM: Z95.2  ICD-9-CM: V43.3     Mixed hyperlipidemia     ICD-10-CM: E78.2  ICD-9-CM: 272.2     COPD mixed type (CMS/[facility])     ICD-10-CM: J44.9  ICD-9-CM: 496     Type 2 diabetes mellitus with complication, with long-term current use of insulin (CMS/HCC)      ICD-10-CM: E11.8, Z79.4  ICD-9-CM: 250.90, V58.67     Overweight     ICD-10-CM: E66.3  ICD-9-CM: 278.02     Former smoker     ICD-10-CM: Z87.891  ICD-9-CM: V15.82           Patient's Body mass index is 27.42 kg/m². BMI is above normal parameters. Recommendations include: nutrition counseling.              Electronically signed by CHELSIE Chopra March 22, 2021 15:58 EDT

## 2021-03-25 ENCOUNTER — LAB (OUTPATIENT)
Dept: LAB | Facility: HOSPITAL | Age: 61
End: 2021-03-25

## 2021-03-25 DIAGNOSIS — Z95.2 S/P AVR: ICD-10-CM

## 2021-03-25 DIAGNOSIS — Z79.01 CURRENT USE OF LONG TERM ANTICOAGULATION: ICD-10-CM

## 2021-03-25 LAB
INR PPP: 2.79 (ref 0.9–1.1)
PROTHROMBIN TIME: 29.3 SECONDS (ref 11.9–14.1)

## 2021-03-25 PROCEDURE — 36415 COLL VENOUS BLD VENIPUNCTURE: CPT

## 2021-03-25 PROCEDURE — 85610 PROTHROMBIN TIME: CPT

## 2021-03-26 ENCOUNTER — ANTICOAGULATION VISIT (OUTPATIENT)
Dept: CARDIOLOGY | Facility: CLINIC | Age: 61
End: 2021-03-26

## 2021-03-26 RX ORDER — TRAZODONE HYDROCHLORIDE 100 MG/1
100 TABLET ORAL NIGHTLY
COMMUNITY

## 2021-03-26 RX ORDER — GLYBURIDE 5 MG/1
5 TABLET ORAL
COMMUNITY
End: 2022-04-21

## 2021-04-08 ENCOUNTER — APPOINTMENT (OUTPATIENT)
Dept: CARDIOLOGY | Facility: HOSPITAL | Age: 61
End: 2021-04-08

## 2021-04-08 ENCOUNTER — HOSPITAL ENCOUNTER (OUTPATIENT)
Dept: CARDIOLOGY | Facility: HOSPITAL | Age: 61
End: 2021-04-08

## 2021-04-28 ENCOUNTER — HOSPITAL ENCOUNTER (OUTPATIENT)
Dept: CARDIOLOGY | Facility: HOSPITAL | Age: 61
Discharge: HOME | End: 2021-04-28

## 2021-04-28 ENCOUNTER — TELEPHONE (OUTPATIENT)
Dept: CARDIOLOGY | Facility: CLINIC | Age: 61
End: 2021-04-28

## 2021-04-28 ENCOUNTER — LAB (OUTPATIENT)
Dept: LAB | Facility: HOSPITAL | Age: 61
End: 2021-04-28

## 2021-04-28 VITALS — HEIGHT: 62 IN | WEIGHT: 149.03 LBS | BODY MASS INDEX: 27.43 KG/M2

## 2021-04-28 DIAGNOSIS — I35.0 AORTIC STENOSIS, MODERATE: ICD-10-CM

## 2021-04-28 DIAGNOSIS — I20.8 ANGINAL EQUIVALENT (HCC): ICD-10-CM

## 2021-04-28 DIAGNOSIS — R06.02 SHORTNESS OF BREATH: ICD-10-CM

## 2021-04-28 DIAGNOSIS — Z95.2 S/P AVR: ICD-10-CM

## 2021-04-28 DIAGNOSIS — R01.1 HEART MURMUR: ICD-10-CM

## 2021-04-28 DIAGNOSIS — Z79.01 CURRENT USE OF LONG TERM ANTICOAGULATION: ICD-10-CM

## 2021-04-28 LAB
AORTIC DIMENSIONLESS INDEX: 0.3 (DI)
BH CV ECHO MEAS - AO MAX PG (FULL): 20.3 MMHG
BH CV ECHO MEAS - AO MAX PG: 22.7 MMHG
BH CV ECHO MEAS - AO MEAN PG (FULL): 11 MMHG
BH CV ECHO MEAS - AO MEAN PG: 12 MMHG
BH CV ECHO MEAS - AO ROOT AREA (BSA CORRECTED): 2.2
BH CV ECHO MEAS - AO ROOT AREA: 10.8 CM^2
BH CV ECHO MEAS - AO ROOT DIAM: 3.7 CM
BH CV ECHO MEAS - AO V2 MAX: 238 CM/SEC
BH CV ECHO MEAS - AO V2 MEAN: 160 CM/SEC
BH CV ECHO MEAS - AO V2 VTI: 60.9 CM
BH CV ECHO MEAS - AVA(I,A): 1.1 CM^2
BH CV ECHO MEAS - AVA(I,D): 1.1 CM^2
BH CV ECHO MEAS - AVA(V,A): 1 CM^2
BH CV ECHO MEAS - AVA(V,D): 1 CM^2
BH CV ECHO MEAS - BSA(HAYCOCK): 1.7 M^2
BH CV ECHO MEAS - BSA: 1.7 M^2
BH CV ECHO MEAS - BZI_BMI: 27.3 KILOGRAMS/M^2
BH CV ECHO MEAS - BZI_METRIC_HEIGHT: 157.5 CM
BH CV ECHO MEAS - BZI_METRIC_WEIGHT: 67.6 KG
BH CV ECHO MEAS - EDV(CUBED): 97.3 ML
BH CV ECHO MEAS - EDV(TEICH): 97.3 ML
BH CV ECHO MEAS - EF(CUBED): 53.3 %
BH CV ECHO MEAS - EF(TEICH): 45.2 %
BH CV ECHO MEAS - ESV(CUBED): 45.5 ML
BH CV ECHO MEAS - ESV(TEICH): 53.3 ML
BH CV ECHO MEAS - FS: 22.4 %
BH CV ECHO MEAS - IVS/LVPW: 0.95
BH CV ECHO MEAS - IVSD: 1.1 CM
BH CV ECHO MEAS - LA 3D VOL INDEX: 44
BH CV ECHO MEAS - LA DIMENSION(2D): 3.6 CM
BH CV ECHO MEAS - LA DIMENSION: 3.3 CM
BH CV ECHO MEAS - LA/AO: 0.89
BH CV ECHO MEAS - LAT PEAK E' VEL: 5.4 CM/SEC
BH CV ECHO MEAS - LV IVRT: 0.12 SEC
BH CV ECHO MEAS - LV MASS(C)D: 197.7 GRAMS
BH CV ECHO MEAS - LV MASS(C)DI: 117.2 GRAMS/M^2
BH CV ECHO MEAS - LV MAX PG: 2.3 MMHG
BH CV ECHO MEAS - LV MEAN PG: 1 MMHG
BH CV ECHO MEAS - LV V1 MAX: 76.4 CM/SEC
BH CV ECHO MEAS - LV V1 MEAN: 51.7 CM/SEC
BH CV ECHO MEAS - LV V1 VTI: 21.9 CM
BH CV ECHO MEAS - LVIDD: 4.6 CM
BH CV ECHO MEAS - LVIDS: 3.6 CM
BH CV ECHO MEAS - LVOT AREA (M): 3.1 CM^2
BH CV ECHO MEAS - LVOT AREA: 3.1 CM^2
BH CV ECHO MEAS - LVOT DIAM: 2 CM
BH CV ECHO MEAS - LVPWD: 1.2 CM
BH CV ECHO MEAS - MED PEAK E' VEL: 5.1 CM/SEC
BH CV ECHO MEAS - MV A MAX VEL: 139 CM/SEC
BH CV ECHO MEAS - MV DEC SLOPE: 934 CM/SEC^2
BH CV ECHO MEAS - MV DEC TIME: 0.16 SEC
BH CV ECHO MEAS - MV E MAX VEL: 115 CM/SEC
BH CV ECHO MEAS - MV E/A: 0.83
BH CV ECHO MEAS - MV MAX PG: 8.2 MMHG
BH CV ECHO MEAS - MV MEAN PG: 4 MMHG
BH CV ECHO MEAS - MV P1/2T MAX VEL: 124 CM/SEC
BH CV ECHO MEAS - MV P1/2T: 38.9 MSEC
BH CV ECHO MEAS - MV V2 MAX: 143 CM/SEC
BH CV ECHO MEAS - MV V2 MEAN: 94.6 CM/SEC
BH CV ECHO MEAS - MV V2 VTI: 38.6 CM
BH CV ECHO MEAS - MVA P1/2T LCG: 1.8 CM^2
BH CV ECHO MEAS - MVA(P1/2T): 5.7 CM^2
BH CV ECHO MEAS - MVA(VTI): 1.8 CM^2
BH CV ECHO MEAS - PA MAX PG (FULL): 0.73 MMHG
BH CV ECHO MEAS - PA MAX PG: 2.1 MMHG
BH CV ECHO MEAS - PA MEAN PG (FULL): 0 MMHG
BH CV ECHO MEAS - PA MEAN PG: 1 MMHG
BH CV ECHO MEAS - PA V2 MAX: 73.2 CM/SEC
BH CV ECHO MEAS - PA V2 MEAN: 54.4 CM/SEC
BH CV ECHO MEAS - PA V2 VTI: 17.7 CM
BH CV ECHO MEAS - RAP SYSTOLE: 10 MMHG
BH CV ECHO MEAS - RV MAX PG: 1.4 MMHG
BH CV ECHO MEAS - RV MEAN PG: 1 MMHG
BH CV ECHO MEAS - RV V1 MAX: 59.4 CM/SEC
BH CV ECHO MEAS - RV V1 MEAN: 43.5 CM/SEC
BH CV ECHO MEAS - RV V1 VTI: 14.8 CM
BH CV ECHO MEAS - RVDD: 2.6 CM
BH CV ECHO MEAS - RVSP: 24 MMHG
BH CV ECHO MEAS - SI(AO): 388.2 ML/M^2
BH CV ECHO MEAS - SI(CUBED): 30.7 ML/M^2
BH CV ECHO MEAS - SI(LVOT): 40.8 ML/M^2
BH CV ECHO MEAS - SI(TEICH): 26.1 ML/M^2
BH CV ECHO MEAS - SV(AO): 654.8 ML
BH CV ECHO MEAS - SV(CUBED): 51.8 ML
BH CV ECHO MEAS - SV(LVOT): 68.8 ML
BH CV ECHO MEAS - SV(TEICH): 44 ML
BH CV ECHO MEAS - TR MAX VEL: 184 CM/SEC
BH CV ECHO MEASUREMENTS AVERAGE E/E' RATIO: 21.9
BH CV NUCLEAR PRIOR STUDY: 3
BH CV REST NUCLEAR ISOTOPE DOSE: 10 MCI
BH CV STRESS COMMENTS STAGE 1: NORMAL
BH CV STRESS DOSE REGADENOSON STAGE 1: 0.4
BH CV STRESS DURATION MIN STAGE 1: 0
BH CV STRESS DURATION SEC STAGE 1: 10
BH CV STRESS NUCLEAR ISOTOPE DOSE: 30 MCI
BH CV STRESS PROTOCOL 1: NORMAL
BH CV STRESS RECOVERY BP: NORMAL MMHG
BH CV STRESS RECOVERY HR: 78 BPM
BH CV STRESS STAGE 1: 1
INR PPP: 9.89 (ref 0.9–1.1)
LV EF NUC BP: 58 %
MAXIMAL PREDICTED HEART RATE: 161 BPM
MAXIMAL PREDICTED HEART RATE: 161 BPM
PERCENT MAX PREDICTED HR: 58.39 %
PROTHROMBIN TIME: 80.1 SECONDS (ref 11.9–14.1)
STRESS BASELINE BP: NORMAL MMHG
STRESS BASELINE HR: 76 BPM
STRESS PERCENT HR: 69 %
STRESS POST PEAK BP: NORMAL MMHG
STRESS POST PEAK HR: 94 BPM
STRESS TARGET HR: 137 BPM
STRESS TARGET HR: 137 BPM

## 2021-04-28 PROCEDURE — A9500 TC99M SESTAMIBI: HCPCS | Performed by: INTERNAL MEDICINE

## 2021-04-28 PROCEDURE — 93018 CV STRESS TEST I&R ONLY: CPT | Performed by: INTERNAL MEDICINE

## 2021-04-28 PROCEDURE — 78452 HT MUSCLE IMAGE SPECT MULT: CPT | Performed by: INTERNAL MEDICINE

## 2021-04-28 PROCEDURE — 93306 TTE W/DOPPLER COMPLETE: CPT

## 2021-04-28 PROCEDURE — 25010000002 REGADENOSON 0.4 MG/5ML SOLUTION

## 2021-04-28 PROCEDURE — 93356 MYOCRD STRAIN IMG SPCKL TRCK: CPT | Performed by: INTERNAL MEDICINE

## 2021-04-28 PROCEDURE — 93017 CV STRESS TEST TRACING ONLY: CPT

## 2021-04-28 PROCEDURE — 85610 PROTHROMBIN TIME: CPT

## 2021-04-28 PROCEDURE — 93356 MYOCRD STRAIN IMG SPCKL TRCK: CPT

## 2021-04-28 PROCEDURE — 36415 COLL VENOUS BLD VENIPUNCTURE: CPT

## 2021-04-28 PROCEDURE — 93016 CV STRESS TEST SUPVJ ONLY: CPT | Performed by: NURSE PRACTITIONER

## 2021-04-28 PROCEDURE — 0 TECHNETIUM SESTAMIBI: Performed by: INTERNAL MEDICINE

## 2021-04-28 PROCEDURE — 93306 TTE W/DOPPLER COMPLETE: CPT | Performed by: INTERNAL MEDICINE

## 2021-04-28 PROCEDURE — 78452 HT MUSCLE IMAGE SPECT MULT: CPT

## 2021-04-28 RX ADMIN — TECHNETIUM TC 99M SESTAMIBI 1 DOSE: 1 INJECTION INTRAVENOUS at 09:36

## 2021-04-29 ENCOUNTER — ANTICOAGULATION VISIT (OUTPATIENT)
Dept: CARDIOLOGY | Facility: CLINIC | Age: 61
End: 2021-04-29

## 2021-04-29 RX ORDER — LOSARTAN POTASSIUM 25 MG/1
25 TABLET ORAL DAILY
Qty: 90 TABLET | Refills: 3 | Status: SHIPPED | OUTPATIENT
Start: 2021-04-29 | End: 2021-11-24 | Stop reason: DRUGHIGH

## 2021-04-29 NOTE — TELEPHONE ENCOUNTER
----- Message from CHELSIE Dumont sent at 4/29/2021  8:32 AM EDT -----  EF 58%, small apical ischemia, BP elevated. Add losartan 25mg daily.

## 2021-04-29 NOTE — TELEPHONE ENCOUNTER
Pt voiced understanding of results from Stress and Echo and recommendations to add Losartan 25 mg daily. She would like script sent to [facility] in [place].

## 2021-05-03 ENCOUNTER — LAB (OUTPATIENT)
Dept: LAB | Facility: HOSPITAL | Age: 61
End: 2021-05-03

## 2021-05-03 DIAGNOSIS — Z79.01 CURRENT USE OF LONG TERM ANTICOAGULATION: ICD-10-CM

## 2021-05-03 DIAGNOSIS — Z95.2 S/P AVR: ICD-10-CM

## 2021-05-03 LAB
INR PPP: 1.14 (ref 0.9–1.1)
PROTHROMBIN TIME: 14.4 SECONDS (ref 11.9–14.1)

## 2021-05-03 PROCEDURE — 85610 PROTHROMBIN TIME: CPT

## 2021-05-03 PROCEDURE — 36415 COLL VENOUS BLD VENIPUNCTURE: CPT

## 2021-05-06 ENCOUNTER — LAB (OUTPATIENT)
Dept: LAB | Facility: HOSPITAL | Age: 61
End: 2021-05-06

## 2021-05-06 DIAGNOSIS — Z95.2 S/P AVR: ICD-10-CM

## 2021-05-06 DIAGNOSIS — Z79.01 CURRENT USE OF LONG TERM ANTICOAGULATION: ICD-10-CM

## 2021-05-06 LAB
INR PPP: 3.16
INR PPP: 3.16 (ref 0.9–1.1)
PROTHROMBIN TIME: 32.4 SECONDS (ref 11.9–14.1)

## 2021-05-06 PROCEDURE — 85610 PROTHROMBIN TIME: CPT

## 2021-05-06 PROCEDURE — 36415 COLL VENOUS BLD VENIPUNCTURE: CPT

## 2021-05-11 ENCOUNTER — ANTICOAGULATION VISIT (OUTPATIENT)
Dept: CARDIOLOGY | Facility: CLINIC | Age: 61
End: 2021-05-11

## 2021-05-19 ENCOUNTER — LAB (OUTPATIENT)
Dept: LAB | Facility: HOSPITAL | Age: 61
End: 2021-05-19

## 2021-05-19 DIAGNOSIS — Z95.2 S/P AVR: ICD-10-CM

## 2021-05-19 DIAGNOSIS — Z79.01 CURRENT USE OF LONG TERM ANTICOAGULATION: ICD-10-CM

## 2021-05-19 LAB
INR PPP: 3.73 (ref 0.9–1.1)
PROTHROMBIN TIME: 36.9 SECONDS (ref 11.9–14.1)

## 2021-05-19 PROCEDURE — 36415 COLL VENOUS BLD VENIPUNCTURE: CPT

## 2021-05-19 PROCEDURE — 85610 PROTHROMBIN TIME: CPT

## 2021-05-20 ENCOUNTER — ANTICOAGULATION VISIT (OUTPATIENT)
Dept: CARDIOLOGY | Facility: CLINIC | Age: 61
End: 2021-05-20

## 2021-06-01 ENCOUNTER — LAB (OUTPATIENT)
Dept: LAB | Facility: HOSPITAL | Age: 61
End: 2021-06-01

## 2021-06-01 DIAGNOSIS — Z79.01 CURRENT USE OF LONG TERM ANTICOAGULATION: ICD-10-CM

## 2021-06-01 DIAGNOSIS — Z95.2 S/P AVR: ICD-10-CM

## 2021-06-01 LAB
INR PPP: 3.83 (ref 0.9–1.1)
PROTHROMBIN TIME: 37.9 SECONDS (ref 12.8–14.5)

## 2021-06-01 PROCEDURE — 85610 PROTHROMBIN TIME: CPT

## 2021-06-01 PROCEDURE — 36415 COLL VENOUS BLD VENIPUNCTURE: CPT

## 2021-06-02 ENCOUNTER — ANTICOAGULATION VISIT (OUTPATIENT)
Dept: CARDIOLOGY | Facility: CLINIC | Age: 61
End: 2021-06-02

## 2021-06-07 DIAGNOSIS — Z79.01 LONG TERM (CURRENT) USE OF ANTICOAGULANTS: ICD-10-CM

## 2021-06-07 DIAGNOSIS — Z95.2 S/P AVR: Primary | ICD-10-CM

## 2021-06-07 NOTE — TELEPHONE ENCOUNTER
Received fax from Dr. Otto for cardiac clearance for patient to have a knee arthroscopy. Patient is on warfarin and they are needing to hold. According to our records, I do not see where patient has had any stenting. Patient had a mechanical AVR in 09/1995.      Fax [phone]

## 2021-06-08 ENCOUNTER — LAB (OUTPATIENT)
Dept: LAB | Facility: HOSPITAL | Age: 61
End: 2021-06-08

## 2021-06-08 DIAGNOSIS — Z95.2 S/P AVR: ICD-10-CM

## 2021-06-08 DIAGNOSIS — Z79.01 CURRENT USE OF LONG TERM ANTICOAGULATION: ICD-10-CM

## 2021-06-08 LAB
INR PPP: 2.15 (ref 0.9–1.1)
PROTHROMBIN TIME: 24.4 SECONDS (ref 12.8–14.5)

## 2021-06-08 PROCEDURE — 85610 PROTHROMBIN TIME: CPT

## 2021-06-08 PROCEDURE — 36415 COLL VENOUS BLD VENIPUNCTURE: CPT

## 2021-06-09 ENCOUNTER — ANTICOAGULATION VISIT (OUTPATIENT)
Dept: CARDIOLOGY | Facility: CLINIC | Age: 61
End: 2021-06-09

## 2021-06-14 DIAGNOSIS — I35.0 AORTIC STENOSIS, MODERATE: ICD-10-CM

## 2021-06-14 DIAGNOSIS — Z95.2 S/P AVR: ICD-10-CM

## 2021-06-14 DIAGNOSIS — I71.20 THORACIC AORTIC ANEURYSM, WITHOUT RUPTURE: ICD-10-CM

## 2021-06-14 DIAGNOSIS — I71.10 THORACIC ANEURYSM, RUPTURED (HCC): Primary | ICD-10-CM

## 2021-06-15 ENCOUNTER — LAB (OUTPATIENT)
Dept: LAB | Facility: HOSPITAL | Age: 61
End: 2021-06-15

## 2021-06-15 DIAGNOSIS — Z95.2 S/P AVR: ICD-10-CM

## 2021-06-15 DIAGNOSIS — Z79.01 CURRENT USE OF LONG TERM ANTICOAGULATION: ICD-10-CM

## 2021-06-15 LAB
INR PPP: 2.35 (ref 0.9–1.1)
PROTHROMBIN TIME: 26.1 SECONDS (ref 12.8–14.5)

## 2021-06-15 PROCEDURE — 36415 COLL VENOUS BLD VENIPUNCTURE: CPT

## 2021-06-15 PROCEDURE — 85610 PROTHROMBIN TIME: CPT

## 2021-06-15 RX ORDER — WARFARIN SODIUM 5 MG/1
5 TABLET ORAL
Qty: 30 TABLET | Refills: 7 | Status: SHIPPED | OUTPATIENT
Start: 2021-06-15 | End: 2021-11-24 | Stop reason: SDUPTHER

## 2021-06-16 ENCOUNTER — ANTICOAGULATION VISIT (OUTPATIENT)
Dept: CARDIOLOGY | Facility: CLINIC | Age: 61
End: 2021-06-16

## 2021-06-28 NOTE — TELEPHONE ENCOUNTER
Pt aware to  copy of Lovenox protocol and call if she has any questions. Script will be faxed to [facility].

## 2021-07-22 ENCOUNTER — LAB (OUTPATIENT)
Dept: LAB | Facility: HOSPITAL | Age: 61
End: 2021-07-22

## 2021-07-22 ENCOUNTER — ANTICOAGULATION VISIT (OUTPATIENT)
Dept: CARDIOLOGY | Facility: CLINIC | Age: 61
End: 2021-07-22

## 2021-07-22 DIAGNOSIS — Z79.01 CURRENT USE OF LONG TERM ANTICOAGULATION: ICD-10-CM

## 2021-07-22 DIAGNOSIS — Z95.2 S/P AVR: ICD-10-CM

## 2021-07-22 LAB
INR PPP: 1.03 (ref 0.9–1.1)
PROTHROMBIN TIME: 13.9 SECONDS (ref 12.8–14.5)

## 2021-07-22 PROCEDURE — 36415 COLL VENOUS BLD VENIPUNCTURE: CPT

## 2021-07-22 PROCEDURE — 85610 PROTHROMBIN TIME: CPT

## 2021-07-26 ENCOUNTER — LAB (OUTPATIENT)
Dept: LAB | Facility: HOSPITAL | Age: 61
End: 2021-07-26

## 2021-07-26 DIAGNOSIS — Z79.01 CURRENT USE OF LONG TERM ANTICOAGULATION: ICD-10-CM

## 2021-07-26 DIAGNOSIS — Z95.2 S/P AVR: ICD-10-CM

## 2021-07-26 LAB
INR PPP: 2.98 (ref 0.9–1.1)
PROTHROMBIN TIME: 31.3 SECONDS (ref 12.8–14.5)

## 2021-07-26 PROCEDURE — 85610 PROTHROMBIN TIME: CPT

## 2021-07-26 PROCEDURE — 36415 COLL VENOUS BLD VENIPUNCTURE: CPT

## 2021-07-27 ENCOUNTER — ANTICOAGULATION VISIT (OUTPATIENT)
Dept: CARDIOLOGY | Facility: CLINIC | Age: 61
End: 2021-07-27

## 2021-08-17 ENCOUNTER — LAB (OUTPATIENT)
Dept: LAB | Facility: HOSPITAL | Age: 61
End: 2021-08-17

## 2021-08-17 DIAGNOSIS — Z79.01 CURRENT USE OF LONG TERM ANTICOAGULATION: ICD-10-CM

## 2021-08-17 DIAGNOSIS — Z95.2 S/P AVR: ICD-10-CM

## 2021-08-17 LAB
INR PPP: 2.22 (ref 0.9–1.1)
PROTHROMBIN TIME: 25 SECONDS (ref 12.8–14.5)

## 2021-08-17 PROCEDURE — 85610 PROTHROMBIN TIME: CPT

## 2021-08-17 PROCEDURE — 36415 COLL VENOUS BLD VENIPUNCTURE: CPT

## 2021-08-23 ENCOUNTER — ANTICOAGULATION VISIT (OUTPATIENT)
Dept: CARDIOLOGY | Facility: CLINIC | Age: 61
End: 2021-08-23

## 2021-09-07 DIAGNOSIS — Z79.01 LONG TERM (CURRENT) USE OF ANTICOAGULANTS: ICD-10-CM

## 2021-09-07 DIAGNOSIS — Z95.2 S/P AVR: Primary | ICD-10-CM

## 2021-09-13 ENCOUNTER — LAB (OUTPATIENT)
Dept: LAB | Facility: HOSPITAL | Age: 61
End: 2021-09-13

## 2021-09-13 DIAGNOSIS — Z79.01 LONG TERM (CURRENT) USE OF ANTICOAGULANTS: ICD-10-CM

## 2021-09-13 DIAGNOSIS — Z95.2 S/P AVR: ICD-10-CM

## 2021-09-13 LAB
INR PPP: 2.89 (ref 0.9–1.1)
PROTHROMBIN TIME: 30.6 SECONDS (ref 12.8–14.5)

## 2021-09-13 PROCEDURE — 85610 PROTHROMBIN TIME: CPT

## 2021-09-13 PROCEDURE — 36415 COLL VENOUS BLD VENIPUNCTURE: CPT

## 2021-09-14 ENCOUNTER — ANTICOAGULATION VISIT (OUTPATIENT)
Dept: CARDIOLOGY | Facility: CLINIC | Age: 61
End: 2021-09-14

## 2021-10-11 ENCOUNTER — LAB (OUTPATIENT)
Dept: LAB | Facility: HOSPITAL | Age: 61
End: 2021-10-11

## 2021-10-11 DIAGNOSIS — Z79.01 LONG TERM (CURRENT) USE OF ANTICOAGULANTS: ICD-10-CM

## 2021-10-11 DIAGNOSIS — Z95.2 S/P AVR: ICD-10-CM

## 2021-10-11 LAB
INR PPP: 3.03 (ref 0.9–1.1)
PROTHROMBIN TIME: 31.7 SECONDS (ref 12.8–14.5)

## 2021-10-11 PROCEDURE — 36415 COLL VENOUS BLD VENIPUNCTURE: CPT

## 2021-10-11 PROCEDURE — 85610 PROTHROMBIN TIME: CPT

## 2021-10-12 ENCOUNTER — ANTICOAGULATION VISIT (OUTPATIENT)
Dept: CARDIOLOGY | Facility: CLINIC | Age: 61
End: 2021-10-12

## 2021-10-12 NOTE — PROGRESS NOTES
Detail Level: Generalized Pt aware to cont current dose of Coumadin and recheck in 1 month.  Detail Level: Detailed

## 2021-11-17 ENCOUNTER — LAB (OUTPATIENT)
Dept: LAB | Facility: HOSPITAL | Age: 61
End: 2021-11-17

## 2021-11-17 DIAGNOSIS — Z79.01 LONG TERM (CURRENT) USE OF ANTICOAGULANTS: ICD-10-CM

## 2021-11-17 DIAGNOSIS — Z95.2 S/P AVR: ICD-10-CM

## 2021-11-17 LAB
INR PPP: 3.33 (ref 0.9–1.1)
PROTHROMBIN TIME: 34.1 SECONDS (ref 12.8–14.5)

## 2021-11-17 PROCEDURE — 85610 PROTHROMBIN TIME: CPT

## 2021-11-17 PROCEDURE — 36415 COLL VENOUS BLD VENIPUNCTURE: CPT

## 2021-11-18 ENCOUNTER — ANTICOAGULATION VISIT (OUTPATIENT)
Dept: CARDIOLOGY | Facility: CLINIC | Age: 61
End: 2021-11-18

## 2021-11-24 ENCOUNTER — OFFICE VISIT (OUTPATIENT)
Dept: CARDIOLOGY | Facility: CLINIC | Age: 61
End: 2021-11-24

## 2021-11-24 VITALS
SYSTOLIC BLOOD PRESSURE: 142 MMHG | WEIGHT: 145.2 LBS | HEIGHT: 61 IN | DIASTOLIC BLOOD PRESSURE: 84 MMHG | BODY MASS INDEX: 27.41 KG/M2 | HEART RATE: 64 BPM | TEMPERATURE: 97.1 F

## 2021-11-24 DIAGNOSIS — I71.20 THORACIC AORTIC ANEURYSM WITHOUT RUPTURE (HCC): ICD-10-CM

## 2021-11-24 DIAGNOSIS — Z79.4 TYPE 2 DIABETES MELLITUS WITH COMPLICATION, WITH LONG-TERM CURRENT USE OF INSULIN (HCC): ICD-10-CM

## 2021-11-24 DIAGNOSIS — Z95.2 S/P AVR: Primary | ICD-10-CM

## 2021-11-24 DIAGNOSIS — R01.1 HEART MURMUR: ICD-10-CM

## 2021-11-24 DIAGNOSIS — E66.3 OVERWEIGHT: ICD-10-CM

## 2021-11-24 DIAGNOSIS — J44.9 COPD MIXED TYPE (HCC): ICD-10-CM

## 2021-11-24 DIAGNOSIS — I35.0 AORTIC STENOSIS, MODERATE: ICD-10-CM

## 2021-11-24 DIAGNOSIS — I10 ESSENTIAL HYPERTENSION: ICD-10-CM

## 2021-11-24 DIAGNOSIS — E78.2 MIXED HYPERLIPIDEMIA: ICD-10-CM

## 2021-11-24 DIAGNOSIS — E11.8 TYPE 2 DIABETES MELLITUS WITH COMPLICATION, WITH LONG-TERM CURRENT USE OF INSULIN (HCC): ICD-10-CM

## 2021-11-24 PROCEDURE — 99214 OFFICE O/P EST MOD 30 MIN: CPT | Performed by: NURSE PRACTITIONER

## 2021-11-24 RX ORDER — LOSARTAN POTASSIUM 50 MG/1
50 TABLET ORAL DAILY
Qty: 30 TABLET | Refills: 8 | Status: SHIPPED | OUTPATIENT
Start: 2021-11-24 | End: 2022-04-21 | Stop reason: SDUPTHER

## 2021-11-24 RX ORDER — WARFARIN SODIUM 5 MG/1
TABLET ORAL
Qty: 90 TABLET | Refills: 2 | Status: SHIPPED | OUTPATIENT
Start: 2021-11-24 | End: 2022-04-21 | Stop reason: SDUPTHER

## 2021-11-24 NOTE — PROGRESS NOTES
Chief Complaint   Patient presents with   • Follow-up     Pt is here for cardiac follow up. She is joined today in the room by her . She denies CP, SOB, dizziness or palpitations.Pt does not take a daily ASA.   • Med Refill     Pt's PCP is currently filling all medications.   • Lab Work     Pt states that her PCP did routine labs on her last month.       Cardiac Complaints  none      Subjective   Estefany Castañeda is a 61 y.o. female with hypertension, hyperlipidemia, diabetes, COPD with remote tobacco use, and bicuspid aortic valve, S/P aortic valve replacement in 1995.  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  She was referred in May 2016 for chest pain and shortness of breath. Cardiac workup done at that time showed no ischemia, normal LV function, and an aortic valve replacement that was well seated and functioning properly.  In December 2016, PCP called the office to report symptoms including chest pain. Repeat echo was done and no changes noted. She continues to be managed medically. She is anticoagulated with warfarin managed by PCP. She returned in May 2019 complaining of chest pain and shortness of breath, repeat cardiac workup was recommended. Stress and echo advised showed no ischemia, good LV function, and moderate AS with GEENA of 1.4, valve well seated and functioning properly. At last visit, she continued to have SOA and repeat echo advised. Most recent echo from 3/2020 showed GEENA at 1.2cm2 and EF of 60%. She did have concerns over AAA and US was advised, no AAA noted.  Most CT of the chest in 2021 showed ascending aneurysm of 4.2 cm, increased from 4.1cm prior.    She returns today for follow up and no new concerns voiced. SOA noted, but similar to prior. Using 2LNC. She denies any CP, dizziness, palpitations, or syncope. She does take ASA and tolerates without concerns. Bleed and bruise denied on  current anticoagulant. Labs are done with PCP and checked most recently a month ago, no current available. Refills not needed.       Cardiac History  Past Surgical History:   Procedure Laterality Date   • AORTIC VALVE REPAIR/REPLACEMENT  09/1995    [facility]    • CARDIAC CATHETERIZATION  07/18/2009    () Normal Coronaries. Acute takeoff of the LM.    • CARDIOVASCULAR STRESS TEST  05/19/2016    EF 65%, no ischemia   • CARDIOVASCULAR STRESS TEST  05/16/2019    L.Cardiolite- EF 65%. Negative.   • CARDIOVASCULAR STRESS TEST  04/28/2021    Lexiscan- EF 58%. BP- 170/76. Apical Infarct Vs Thinning   • ECHO - CONVERTED  07/20/2009    () Normal Coronaries. Acute takeoff of the LM.    • ECHO - CONVERTED  05/19/2016    EF 50-55%, mod AS, AVR well seated and well functioning   • ECHO - CONVERTED  12/08/2016    EF 55-60%. GEENA- 1.5 Cm2. Gr- 34 mmHg. RVSP 23 mmHg   • ECHO - CONVERTED  05/16/2019    EF 65%. AVR. GEENA- 1.4 Cm2. Gr- 25 mmHg. Mild MR. RVSP- 24 mmHg.   • ECHO - CONVERTED  03/25/2020    EF 60%. AVR. GEENA- 1.2 Cm2. 17/29 mmHg. Mild MR & AI.   • ECHO - CONVERTED  04/28/2021    EF 60%. AVR. GEENA- 1.1 Cm2. 12/23 mmHg. Mild MR. RVSP- 24 mmHg   • US CAROTID UNILATERAL  11/17/2016    bilateral- no hemodynamically significant stenosis       Current Outpatient Medications   Medication Sig Dispense Refill   • Albuterol Sulfate (PROAIR HFA IN) Inhale As Needed.     • cholecalciferol (VITAMIN D3) 1000 units tablet Take 1,000 Units by mouth Daily.     • diphenhydrAMINE (DIPHENHIST) 25 MG tablet Take 50 mg by mouth 2 (Two) Times a Day.     • escitalopram (LEXAPRO) 20 MG tablet Take 20 mg by mouth Daily.     • esomeprazole (nexIUM) 40 MG capsule Take 40 mg by mouth Every Morning Before Breakfast.     • Fluticasone-Umeclidin-Vilant (Trelegy Ellipta) 100-62.5-25 MCG/INH aerosol powder  Inhale 1 puff.     • gabapentin (NEURONTIN) 800 MG tablet Take 800 mg by mouth 3 (Three) Times a Day.     • glyburide (DIAbeta) 5 MG tablet  Take 5 mg by mouth Daily With Breakfast.     • guaiFENesin (MUCINEX) 600 MG 12 hr tablet Take 600 mg by mouth 2 (Two) Times a Day As Needed.     • Insulin Glargine (BASAGLAR KWIKPEN SC) Inject  under the skin into the appropriate area as directed Every Night.     • insulin regular (humuLIN R,novoLIN R) 100 UNIT/ML injection Inject  under the skin into the appropriate area as directed 3 (Three) Times a Day Before Meals.     • magnesium oxide (MAG-OX) 400 MG tablet Take 1 tablet by mouth Daily. 30 tablet 6   • metoprolol tartrate (LOPRESSOR) 50 MG tablet Take 1 tablet by mouth 2 (Two) Times a Day. 180 tablet 3   • O2 (OXYGEN) Inhale 3 L/min Daily.     • oxybutynin XL (DITROPAN-XL) 10 MG 24 hr tablet Take 10 mg by mouth Daily.     • pravastatin (PRAVACHOL) 20 MG tablet Take 20 mg by mouth Daily.     • tiZANidine (ZANAFLEX) 4 MG tablet Take 4 mg by mouth Every Night.     • traZODone (DESYREL) 100 MG tablet Take 100 mg by mouth Every Night.     • warfarin (COUMADIN) 5 MG tablet  &  5mg   & Saturday 2.5 mg 90 tablet 2   • losartan (Cozaar) 50 MG tablet Take 1 tablet by mouth Daily. 30 tablet 8     No current facility-administered medications for this visit.       Capsaicin, Crestor [rosuvastatin calcium], Demerol [meperidine], Ibuprofen, and Lipitor [atorvastatin]    Past Medical History:   Diagnosis Date   • Aneurysm (HCC)      clipping   • Anxiety    • COPD (chronic obstructive pulmonary disease) (HCC)    • CVA (cerebral vascular accident) (HCC)    • Depression    • Diabetes mellitus (HCC)    • GERD (gastroesophageal reflux disease)    • Hx of appendectomy 2017   • Hypercholesterolemia    • Hypertension    • Mechanical heart valve present        Social History     Socioeconomic History   • Marital status:    Tobacco Use   • Smoking status: Former Smoker     Quit date:      Years since quittin.9   • Smokeless tobacco: Never Used   • Tobacco comment: Quit 6 years ago   Vaping Use   • Vaping  "Use: Never used   Substance and Sexual Activity   • Alcohol use: Not Currently   • Drug use: No       Family History   Problem Relation Age of Onset   • COPD Mother        Review of Systems   Constitutional: Negative for malaise/fatigue and night sweats.   Cardiovascular: Negative for chest pain, claudication, dyspnea on exertion, irregular heartbeat, leg swelling, near-syncope, orthopnea, palpitations and syncope.   Respiratory: Positive for shortness of breath. Negative for cough and wheezing.    Musculoskeletal: Positive for stiffness. Negative for back pain and joint pain.   Gastrointestinal: Negative for anorexia, heartburn, nausea and vomiting.   Genitourinary: Negative for dysuria, hematuria, hesitancy and nocturia.   Neurological: Negative for dizziness, light-headedness and loss of balance.   Psychiatric/Behavioral: Negative for depression and memory loss. The patient is not nervous/anxious.            Objective     /84 (BP Location: Left arm, Patient Position: Sitting)   Pulse 64   Temp 97.1 °F (36.2 °C)   Ht 154.9 cm (61\")   Wt 65.9 kg (145 lb 3.2 oz)   BMI 27.44 kg/m²     Constitutional:       Appearance: Frail.   Eyes:      Pupils: Pupils are equal, round, and reactive to light.   HENT:      Nose: Nose normal.   Pulmonary:      Effort: Pulmonary effort is normal.      Breath sounds: Wheezing present.   Cardiovascular:      PMI at left midclavicular line. Normal rate. Regular rhythm.      Murmurs: There is a systolic murmur.   Abdominal:      Palpations: Abdomen is soft.   Musculoskeletal: Normal range of motion.      Cervical back: Normal range of motion and neck supple. Skin:     General: Skin is warm and dry.   Neurological:      Mental Status: Oriented to person, place and time.         Procedures    Assessment/Plan     HTN:  Blood pressure elevated. No recent med list is available. We will continue with current metoprolol at 50mg BID? And add HCTZ 25mg to current. Cozaar continued. " Limited sodium advised.    Cardiac status:  Most recent cardiac workup negative for ischemia, infarct noted. No new stress advised as no concerns voiced.      Murmur/AVR:  Most recent echo showed GEENA 1.1cm2, valve well seated. Repeat echo advised to reassess.  Remains on coumadin therapy for anticoagulation, PT/INR with our office, bleed and bruise denied.     Hyperlipidemia:  Remains on statin and fenofibrate therapy  ?? and tolerates well. Side effects denied. FLP followed by your office, can we have for review?      SOA/COPD:  Continues to use supplemental oxygen therapy. Continues to be smoke free. Followed by pulmonary.      Ascending aneurysm:  Most recent CT of chest showed thoracic aneurysm slightly increased. Will continue yearly monitoring.     DM:  Insulin dependent. Followed by your office. She states AIC to be checked next visit. Can we have for our review?      BMI noted at 27.44 good cardiac ADA diet with limited carbs/calories and activity as tolerated.      Coumadin sent per request.     6 month follow up recommended or sooner if needed.       Problems Addressed this Visit        Cardiac and Vasculature    S/P AVR - Primary    Relevant Medications    warfarin (COUMADIN) 5 MG tablet    Other Relevant Orders    Adult Transthoracic Echo Complete W/ Cont if Necessary Per Protocol    Hyperlipemia    Essential hypertension    Relevant Medications    losartan (Cozaar) 50 MG tablet       Endocrine and Metabolic    Type 2 diabetes mellitus with complication, with long-term current use of insulin (HCC)       Pulmonary and Pneumonias    COPD mixed type (HCC)      Other Visit Diagnoses     Heart murmur        Relevant Orders    Adult Transthoracic Echo Complete W/ Cont if Necessary Per Protocol    Aortic stenosis, moderate        Relevant Orders    Adult Transthoracic Echo Complete W/ Cont if Necessary Per Protocol    Thoracic aortic aneurysm without rupture (HCC)        Overweight          Diagnoses        Codes Comments    S/P AVR    -  Primary ICD-10-CM: Z95.2  ICD-9-CM: V43.3     Heart murmur     ICD-10-CM: R01.1  ICD-9-CM: 785.2     Aortic stenosis, moderate     ICD-10-CM: I35.0  ICD-9-CM: 424.1     Essential hypertension     ICD-10-CM: I10  ICD-9-CM: 401.9     Thoracic aortic aneurysm without rupture (HCC)     ICD-10-CM: I71.2  ICD-9-CM: 441.2     Mixed hyperlipidemia     ICD-10-CM: E78.2  ICD-9-CM: 272.2     COPD mixed type (HCC)     ICD-10-CM: J44.9  ICD-9-CM: 496     Type 2 diabetes mellitus with complication, with long-term current use of insulin (HCC)     ICD-10-CM: E11.8, Z79.4  ICD-9-CM: 250.90, V58.67     Overweight     ICD-10-CM: E66.3  ICD-9-CM: 278.02           Patient's Body mass index is 27.44 kg/m². indicating that she is overweight. Good cardiac diet with limited carbs, calories, and activity as tolerated advised.           Electronically signed by CHELSIE Chopra November 24, 2021 17:50 EST

## 2021-12-29 ENCOUNTER — LAB (OUTPATIENT)
Dept: LAB | Facility: HOSPITAL | Age: 61
End: 2021-12-29

## 2021-12-29 DIAGNOSIS — Z95.2 S/P AVR: ICD-10-CM

## 2021-12-29 DIAGNOSIS — Z79.01 LONG TERM (CURRENT) USE OF ANTICOAGULANTS: ICD-10-CM

## 2021-12-29 LAB
INR PPP: 3.27 (ref 0.9–1.1)
PROTHROMBIN TIME: 33.6 SECONDS (ref 12.8–14.5)

## 2021-12-29 PROCEDURE — 85610 PROTHROMBIN TIME: CPT

## 2021-12-29 PROCEDURE — 36415 COLL VENOUS BLD VENIPUNCTURE: CPT

## 2022-01-03 ENCOUNTER — ANTICOAGULATION VISIT (OUTPATIENT)
Dept: CARDIOLOGY | Facility: CLINIC | Age: 62
End: 2022-01-03

## 2022-01-04 ENCOUNTER — APPOINTMENT (OUTPATIENT)
Dept: CARDIOLOGY | Facility: HOSPITAL | Age: 62
End: 2022-01-04

## 2022-01-27 ENCOUNTER — APPOINTMENT (OUTPATIENT)
Dept: WOMENS IMAGING | Facility: HOSPITAL | Age: 62
End: 2022-01-27

## 2022-01-27 PROCEDURE — 77063 BREAST TOMOSYNTHESIS BI: CPT | Performed by: RADIOLOGY

## 2022-01-27 PROCEDURE — 77067 SCR MAMMO BI INCL CAD: CPT | Performed by: RADIOLOGY

## 2022-02-02 ENCOUNTER — HOSPITAL ENCOUNTER (OUTPATIENT)
Dept: CARDIOLOGY | Facility: HOSPITAL | Age: 62
Discharge: HOME | End: 2022-02-02

## 2022-02-02 ENCOUNTER — LAB (OUTPATIENT)
Dept: LAB | Facility: HOSPITAL | Age: 62
End: 2022-02-02

## 2022-02-02 DIAGNOSIS — R01.1 HEART MURMUR: ICD-10-CM

## 2022-02-02 DIAGNOSIS — Z95.2 S/P AVR: ICD-10-CM

## 2022-02-02 DIAGNOSIS — I35.0 AORTIC STENOSIS, MODERATE: ICD-10-CM

## 2022-02-02 DIAGNOSIS — Z79.01 LONG TERM (CURRENT) USE OF ANTICOAGULANTS: ICD-10-CM

## 2022-02-02 LAB
AORTIC DIMENSIONLESS INDEX: 0.3 (DI)
BH CV ECHO MEAS - ACS: 0.65 CM
BH CV ECHO MEAS - AO MAX PG (FULL): 25.1 MMHG
BH CV ECHO MEAS - AO MAX PG: 30 MMHG
BH CV ECHO MEAS - AO MEAN PG (FULL): 14 MMHG
BH CV ECHO MEAS - AO MEAN PG: 16 MMHG
BH CV ECHO MEAS - AO ROOT AREA (BSA CORRECTED): 2.4
BH CV ECHO MEAS - AO ROOT AREA: 14.2 CM^2
BH CV ECHO MEAS - AO ROOT DIAM: 4.3 CM
BH CV ECHO MEAS - AO V2 MAX: 261.3 CM/SEC
BH CV ECHO MEAS - AO V2 MEAN: 193 CM/SEC
BH CV ECHO MEAS - AO V2 VTI: 63.2 CM
BH CV ECHO MEAS - ASC AORTA: 3.6 CM
BH CV ECHO MEAS - AVA(I,A): 0.98 CM^2
BH CV ECHO MEAS - AVA(I,D): 0.98 CM^2
BH CV ECHO MEAS - AVA(V,A): 0.9 CM^2
BH CV ECHO MEAS - AVA(V,D): 0.9 CM^2
BH CV ECHO MEAS - BSA(HAYCOCK): 1.8 M^2
BH CV ECHO MEAS - BSA: 1.8 M^2
BH CV ECHO MEAS - BZI_BMI: 31.7 KILOGRAMS/M^2
BH CV ECHO MEAS - BZI_METRIC_HEIGHT: 154.9 CM
BH CV ECHO MEAS - BZI_METRIC_WEIGHT: 76.2 KG
BH CV ECHO MEAS - EDV(CUBED): 76.2 ML
BH CV ECHO MEAS - EDV(MOD-SP4): 76.2 ML
BH CV ECHO MEAS - EDV(TEICH): 80.4 ML
BH CV ECHO MEAS - EF(CUBED): 78 %
BH CV ECHO MEAS - EF(MOD-SP4): 55.5 %
BH CV ECHO MEAS - EF(TEICH): 70.5 %
BH CV ECHO MEAS - EF_3D-VOL: 55 %
BH CV ECHO MEAS - ESV(CUBED): 16.8 ML
BH CV ECHO MEAS - ESV(MOD-SP4): 33.9 ML
BH CV ECHO MEAS - ESV(TEICH): 23.7 ML
BH CV ECHO MEAS - FS: 39.6 %
BH CV ECHO MEAS - IVS/LVPW: 0.98
BH CV ECHO MEAS - IVSD: 1.2 CM
BH CV ECHO MEAS - LA DIMENSION: 3.4 CM
BH CV ECHO MEAS - LA/AO: 0.8
BH CV ECHO MEAS - LV DIASTOLIC VOL/BSA (35-75): 43.4 ML/M^2
BH CV ECHO MEAS - LV IVRT: 0.12 SEC
BH CV ECHO MEAS - LV MASS(C)D: 177.5 GRAMS
BH CV ECHO MEAS - LV MASS(C)DI: 101.2 GRAMS/M^2
BH CV ECHO MEAS - LV MAX PG: 2.3 MMHG
BH CV ECHO MEAS - LV MEAN PG: 2 MMHG
BH CV ECHO MEAS - LV SYSTOLIC VOL/BSA (12-30): 19.3 ML/M^2
BH CV ECHO MEAS - LV V1 MAX: 75.3 CM/SEC
BH CV ECHO MEAS - LV V1 MEAN: 59 CM/SEC
BH CV ECHO MEAS - LV V1 VTI: 19.7 CM
BH CV ECHO MEAS - LVIDD: 4.2 CM
BH CV ECHO MEAS - LVIDS: 2.6 CM
BH CV ECHO MEAS - LVLD AP4: 6.9 CM
BH CV ECHO MEAS - LVLS AP4: 5.5 CM
BH CV ECHO MEAS - LVOT AREA (M): 3.1 CM^2
BH CV ECHO MEAS - LVOT AREA: 3.1 CM^2
BH CV ECHO MEAS - LVOT DIAM: 2 CM
BH CV ECHO MEAS - LVPWD: 1.2 CM
BH CV ECHO MEAS - MV A MAX VEL: 133 CM/SEC
BH CV ECHO MEAS - MV DEC SLOPE: 471.3 CM/SEC^2
BH CV ECHO MEAS - MV E MAX VEL: 115 CM/SEC
BH CV ECHO MEAS - MV E/A: 0.86
BH CV ECHO MEAS - MV MAX PG: 8.8 MMHG
BH CV ECHO MEAS - MV MEAN PG: 3.5 MMHG
BH CV ECHO MEAS - MV P1/2T MAX VEL: 116 CM/SEC
BH CV ECHO MEAS - MV P1/2T: 72.1 MSEC
BH CV ECHO MEAS - MV V2 MAX: 145.5 CM/SEC
BH CV ECHO MEAS - MV V2 MEAN: 81.7 CM/SEC
BH CV ECHO MEAS - MV V2 VTI: 34.7 CM
BH CV ECHO MEAS - MVA P1/2T LCG: 1.9 CM^2
BH CV ECHO MEAS - MVA(P1/2T): 3.1 CM^2
BH CV ECHO MEAS - MVA(VTI): 1.8 CM^2
BH CV ECHO MEAS - RVDD: 2.8 CM
BH CV ECHO MEAS - SI(AO): 511.1 ML/M^2
BH CV ECHO MEAS - SI(CUBED): 33.9 ML/M^2
BH CV ECHO MEAS - SI(LVOT): 35.3 ML/M^2
BH CV ECHO MEAS - SI(MOD-SP4): 24.1 ML/M^2
BH CV ECHO MEAS - SI(TEICH): 32.3 ML/M^2
BH CV ECHO MEAS - SV(AO): 896.6 ML
BH CV ECHO MEAS - SV(CUBED): 59.4 ML
BH CV ECHO MEAS - SV(LVOT): 61.9 ML
BH CV ECHO MEAS - SV(MOD-SP4): 42.3 ML
BH CV ECHO MEAS - SV(TEICH): 56.7 ML
INR PPP: 2.78 (ref 0.9–1.1)
MAXIMAL PREDICTED HEART RATE: 160 BPM
PROTHROMBIN TIME: 29.7 SECONDS (ref 12.8–14.5)
STRESS TARGET HR: 136 BPM

## 2022-02-02 PROCEDURE — 93306 TTE W/DOPPLER COMPLETE: CPT | Performed by: INTERNAL MEDICINE

## 2022-02-02 PROCEDURE — 85610 PROTHROMBIN TIME: CPT

## 2022-02-02 PROCEDURE — 93306 TTE W/DOPPLER COMPLETE: CPT

## 2022-02-02 PROCEDURE — 36415 COLL VENOUS BLD VENIPUNCTURE: CPT

## 2022-02-02 NOTE — PROGRESS NOTES
GEENA area more narrow at 0.98, but she is not having any symptoms. Maximum pressure gradient is 30 (characterizing a moderate stenosis).     Continue current. Her risk(s) associated with any surgical intervention would be very high. As long as symptoms remain well managed, continue conservative management.

## 2022-02-03 ENCOUNTER — ANTICOAGULATION VISIT (OUTPATIENT)
Dept: CARDIOLOGY | Facility: CLINIC | Age: 62
End: 2022-02-03

## 2022-02-17 ENCOUNTER — OUTSIDE FACILITY SERVICE (OUTPATIENT)
Dept: CARDIOLOGY | Facility: CLINIC | Age: 62
End: 2022-02-17

## 2022-02-17 PROCEDURE — 99223 1ST HOSP IP/OBS HIGH 75: CPT | Performed by: INTERNAL MEDICINE

## 2022-02-17 PROCEDURE — 93306 TTE W/DOPPLER COMPLETE: CPT | Performed by: INTERNAL MEDICINE

## 2022-02-18 ENCOUNTER — OUTSIDE FACILITY SERVICE (OUTPATIENT)
Dept: CARDIOLOGY | Facility: CLINIC | Age: 62
End: 2022-02-18

## 2022-02-18 PROCEDURE — 99232 SBSQ HOSP IP/OBS MODERATE 35: CPT | Performed by: INTERNAL MEDICINE

## 2022-02-18 PROCEDURE — 93458 L HRT ARTERY/VENTRICLE ANGIO: CPT | Performed by: INTERNAL MEDICINE

## 2022-03-03 ENCOUNTER — ANTICOAGULATION VISIT (OUTPATIENT)
Dept: CARDIOLOGY | Facility: CLINIC | Age: 62
End: 2022-03-03

## 2022-03-03 ENCOUNTER — LAB (OUTPATIENT)
Dept: LAB | Facility: HOSPITAL | Age: 62
End: 2022-03-03

## 2022-03-03 DIAGNOSIS — Z79.01 LONG TERM (CURRENT) USE OF ANTICOAGULANTS: ICD-10-CM

## 2022-03-03 DIAGNOSIS — Z95.2 S/P AVR: ICD-10-CM

## 2022-03-03 LAB
INR PPP: 3.04 (ref 0.9–1.1)
PROTHROMBIN TIME: 31.8 SECONDS (ref 12.8–14.5)

## 2022-03-03 PROCEDURE — 36415 COLL VENOUS BLD VENIPUNCTURE: CPT

## 2022-03-03 PROCEDURE — 85610 PROTHROMBIN TIME: CPT

## 2022-03-11 RX ORDER — METOPROLOL TARTRATE 50 MG/1
50 TABLET, FILM COATED ORAL 2 TIMES DAILY
Qty: 180 TABLET | Refills: 2 | Status: SHIPPED | OUTPATIENT
Start: 2022-03-11 | End: 2022-04-21 | Stop reason: SDUPTHER

## 2022-04-05 ENCOUNTER — LAB (OUTPATIENT)
Dept: LAB | Facility: HOSPITAL | Age: 62
End: 2022-04-05

## 2022-04-05 DIAGNOSIS — Z95.2 S/P AVR: ICD-10-CM

## 2022-04-05 DIAGNOSIS — Z79.01 LONG TERM (CURRENT) USE OF ANTICOAGULANTS: ICD-10-CM

## 2022-04-05 LAB
INR PPP: 3.83 (ref 0.9–1.1)
PROTHROMBIN TIME: 38.9 SECONDS (ref 12.1–14.7)

## 2022-04-05 PROCEDURE — 36415 COLL VENOUS BLD VENIPUNCTURE: CPT

## 2022-04-05 PROCEDURE — 85610 PROTHROMBIN TIME: CPT

## 2022-04-11 ENCOUNTER — ANTICOAGULATION VISIT (OUTPATIENT)
Dept: CARDIOLOGY | Facility: CLINIC | Age: 62
End: 2022-04-11

## 2022-04-21 ENCOUNTER — OFFICE VISIT (OUTPATIENT)
Dept: CARDIOLOGY | Facility: CLINIC | Age: 62
End: 2022-04-21

## 2022-04-21 ENCOUNTER — LAB (OUTPATIENT)
Dept: LAB | Facility: HOSPITAL | Age: 62
End: 2022-04-21

## 2022-04-21 VITALS
DIASTOLIC BLOOD PRESSURE: 64 MMHG | SYSTOLIC BLOOD PRESSURE: 112 MMHG | BODY MASS INDEX: 25.11 KG/M2 | HEIGHT: 61 IN | WEIGHT: 133 LBS | HEART RATE: 78 BPM

## 2022-04-21 DIAGNOSIS — Z79.4 TYPE 2 DIABETES MELLITUS WITH COMPLICATION, WITH LONG-TERM CURRENT USE OF INSULIN: ICD-10-CM

## 2022-04-21 DIAGNOSIS — I10 ESSENTIAL HYPERTENSION: ICD-10-CM

## 2022-04-21 DIAGNOSIS — J44.9 COPD MIXED TYPE: ICD-10-CM

## 2022-04-21 DIAGNOSIS — E11.8 TYPE 2 DIABETES MELLITUS WITH COMPLICATION, WITH LONG-TERM CURRENT USE OF INSULIN: ICD-10-CM

## 2022-04-21 DIAGNOSIS — Z95.2 S/P AVR: ICD-10-CM

## 2022-04-21 DIAGNOSIS — E66.3 OVERWEIGHT: ICD-10-CM

## 2022-04-21 DIAGNOSIS — R01.1 HEART MURMUR: Primary | ICD-10-CM

## 2022-04-21 DIAGNOSIS — I71.20 THORACIC AORTIC ANEURYSM WITHOUT RUPTURE: ICD-10-CM

## 2022-04-21 DIAGNOSIS — E78.2 MIXED HYPERLIPIDEMIA: ICD-10-CM

## 2022-04-21 DIAGNOSIS — Z79.01 LONG TERM (CURRENT) USE OF ANTICOAGULANTS: ICD-10-CM

## 2022-04-21 LAB
INR PPP: 6.11 (ref 0.9–1.1)
PROTHROMBIN TIME: 56.4 SECONDS (ref 12.1–14.7)

## 2022-04-21 PROCEDURE — 99214 OFFICE O/P EST MOD 30 MIN: CPT | Performed by: NURSE PRACTITIONER

## 2022-04-21 PROCEDURE — 85610 PROTHROMBIN TIME: CPT

## 2022-04-21 PROCEDURE — 36415 COLL VENOUS BLD VENIPUNCTURE: CPT

## 2022-04-21 RX ORDER — CLOPIDOGREL BISULFATE 75 MG/1
75 TABLET ORAL DAILY
Qty: 90 TABLET | Refills: 3 | Status: SHIPPED | OUTPATIENT
Start: 2022-04-21 | End: 2022-08-17 | Stop reason: SDUPTHER

## 2022-04-21 RX ORDER — FENOFIBRATE 160 MG/1
160 TABLET ORAL DAILY
Qty: 90 TABLET | Refills: 2 | Status: SHIPPED | OUTPATIENT
Start: 2022-04-21 | End: 2022-08-17 | Stop reason: SDUPTHER

## 2022-04-21 RX ORDER — LOSARTAN POTASSIUM 50 MG/1
50 TABLET ORAL DAILY
Qty: 90 TABLET | Refills: 2 | Status: SHIPPED | OUTPATIENT
Start: 2022-04-21 | End: 2022-08-17 | Stop reason: SDUPTHER

## 2022-04-21 RX ORDER — ATORVASTATIN CALCIUM 40 MG/1
40 TABLET, FILM COATED ORAL DAILY
Qty: 90 TABLET | Refills: 2 | Status: SHIPPED | OUTPATIENT
Start: 2022-04-21 | End: 2022-08-17

## 2022-04-21 RX ORDER — WARFARIN SODIUM 5 MG/1
TABLET ORAL
Qty: 90 TABLET | Refills: 2 | Status: SHIPPED | OUTPATIENT
Start: 2022-04-21 | End: 2022-08-17 | Stop reason: SDUPTHER

## 2022-04-21 RX ORDER — EZETIMIBE 10 MG/1
10 TABLET ORAL DAILY
COMMUNITY
End: 2022-04-21 | Stop reason: SDUPTHER

## 2022-04-21 RX ORDER — ATORVASTATIN CALCIUM 40 MG/1
40 TABLET, FILM COATED ORAL DAILY
COMMUNITY
End: 2022-04-21 | Stop reason: SDUPTHER

## 2022-04-21 RX ORDER — METOPROLOL TARTRATE 50 MG/1
50 TABLET, FILM COATED ORAL 2 TIMES DAILY
Qty: 180 TABLET | Refills: 2 | Status: SHIPPED | OUTPATIENT
Start: 2022-04-21 | End: 2022-08-17 | Stop reason: SDUPTHER

## 2022-04-21 RX ORDER — ASPIRIN 81 MG/1
81 TABLET ORAL DAILY
COMMUNITY
End: 2022-08-17

## 2022-04-21 RX ORDER — DAPAGLIFLOZIN 5 MG/1
5 TABLET, FILM COATED ORAL DAILY
COMMUNITY
End: 2022-08-17

## 2022-04-21 RX ORDER — EZETIMIBE 10 MG/1
10 TABLET ORAL DAILY
Qty: 90 TABLET | Refills: 2 | Status: SHIPPED | OUTPATIENT
Start: 2022-04-21 | End: 2022-08-17

## 2022-04-21 RX ORDER — FENOFIBRATE 160 MG/1
160 TABLET ORAL DAILY
COMMUNITY
End: 2022-04-21 | Stop reason: SDUPTHER

## 2022-04-21 NOTE — PROGRESS NOTES
Chief Complaint   Patient presents with   • Follow-up     Pt is here for cardiac (hospital) follow up.  She denies CP, SOB, dizziness or palpitations.  She had a cath (1 stent placed) and echo completed during her hospitalization. She does take a daily ASA.     • Med Refill     Pt request 90 day refills to be sent to [facility].  Medications were verified during today's OV by medication bottles.   • Lab Work     Pt states her last labs were while she was hospitalized in February ([facility]).       Cardiac Complaints  none      Subjective   Estefany Castañeda is a 61 y.o. female with hypertension, hyperlipidemia, diabetes, COPD with remote tobacco use, and bicuspid aortic valve, S/P aortic valve replacement in 1995.  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  She was referred in May 2016 for chest pain and shortness of breath. Cardiac workup done at that time showed no ischemia, normal LV function, and an aortic valve replacement that was well seated and functioning properly.  In December 2016, PCP called the office to report symptoms including chest pain. Repeat echo was done and no changes noted. She continues to be managed medically. She is anticoagulated with warfarin managed by PCP. She returned in May 2019 complaining of chest pain and shortness of breath, repeat cardiac workup was recommended. Stress and echo advised showed no ischemia, good LV function, and moderate AS with GEENA of 1.4, valve well seated and functioning properly. At last visit, she continued to have SOA and repeat echo advised. Most recent echo from 3/2020 showed GEENA at 1.2cm2 and EF of 60%. She did have concerns over AAA and US was advised, no AAA noted.  Most CT of the chest in 2021 showed ascending aneurysm of 4.2 cm, increased from 4.1cm prior.  She went to the hospital in Feb 2022 with chest pain, borderline elevated troponin. Cardiac cath was  advised that showed 95% RCA stenosis and a single resolute stent was placed. Echo done at that time showed GEENA of 0.98cm2. CTA of chest showed stable aneurysm of aortic root at 4.2cm.     She comes today for follow up and denies any new concerns. No CP,dizziness, syncope, or palpitations reported. Shortness of breath is noted, but no worse than prior. She continues to wear oxygen daily at 2LNC. She is now on lipitor after the hospital and plavix was added for stenting. She denies any bleeding or bruising on current. She has not had an INR recently. She is going today to have it repeated.        Cardiac History  Past Surgical History:   Procedure Laterality Date   • AORTIC VALVE REPAIR/REPLACEMENT  09/1995    [facility]    • CARDIAC CATHETERIZATION  07/18/2009    () Normal Coronaries. Acute takeoff of the LM.    • CARDIAC CATHETERIZATION  02/18/2022    95% RCA- 3.5x22 Resolute   • CARDIOVASCULAR STRESS TEST  05/19/2016    EF 65%, no ischemia   • CARDIOVASCULAR STRESS TEST  05/16/2019    L.Cardiolite- EF 65%. Negative.   • CARDIOVASCULAR STRESS TEST  04/28/2021    Lexiscan- EF 58%. BP- 170/76. Apical Infarct Vs Thinning   • ECHO - CONVERTED  05/19/2016    EF 50-55%, mod AS, AVR well seated and well functioning   • ECHO - CONVERTED  12/08/2016    EF 55-60%. GEENA- 1.5 Cm2. Gr- 34 mmHg. RVSP 23 mmHg   • ECHO - CONVERTED  05/16/2019    EF 65%. AVR. GEENA- 1.4 Cm2. Gr- 25 mmHg. Mild MR. RVSP- 24 mmHg.   • ECHO - CONVERTED  03/25/2020    EF 60%. AVR. GEENA- 1.2 Cm2. 17/29 mmHg. Mild MR & AI.   • ECHO - CONVERTED  04/28/2021    EF 60%. AVR. GEENA- 1.1 Cm2. 12/23 mmHg. Mild MR. RVSP- 24 mmHg   • ECHO - CONVERTED  02/02/2022    EF 60%. AVR, GEENA- 0.98. 16/30. Mild MR & AI. AO- 4.3    • OTHER SURGICAL HISTORY  02/17/2022    CTA chest:  stable 4.2cm thoracic aneurysm   • US CAROTID UNILATERAL  11/17/2016    bilateral- no hemodynamically significant stenosis       Current Outpatient Medications   Medication Sig Dispense Refill   •  aspirin (aspirin) 81 MG EC tablet Take 81 mg by mouth Daily.     • atorvastatin (LIPITOR) 40 MG tablet Take 1 tablet by mouth Daily. 90 tablet 2   • cholecalciferol (VITAMIN D3) 1000 units tablet Take 1,000 Units by mouth Daily.     • dapagliflozin (Farxiga) 5 MG tablet tablet Take 5 mg by mouth Daily.     • diphenhydrAMINE (BENADRYL) 25 MG tablet Take 50 mg by mouth 2 (Two) Times a Day.     • escitalopram (LEXAPRO) 20 MG tablet Take 20 mg by mouth Daily.     • ezetimibe (Zetia) 10 MG tablet Take 1 tablet by mouth Daily. 90 tablet 2   • fenofibrate 160 MG tablet Take 1 tablet by mouth Daily. 90 tablet 2   • Fluticasone-Umeclidin-Vilant (Trelegy Ellipta) 100-62.5-25 MCG/INH aerosol powder  Inhale 1 puff.     • gabapentin (NEURONTIN) 800 MG tablet Take 800 mg by mouth 3 (Three) Times a Day.     • insulin regular (humuLIN R,novoLIN R) 100 UNIT/ML injection Inject  under the skin into the appropriate area as directed 3 (Three) Times a Day Before Meals.     • losartan (Cozaar) 50 MG tablet Take 1 tablet by mouth Daily. 90 tablet 2   • magnesium oxide (MAG-OX) 400 MG tablet Take 1 tablet by mouth Daily. 30 tablet 6   • metFORMIN (GLUCOPHAGE) 500 MG tablet Take 500 mg by mouth 2 (Two) Times a Day With Meals.     • metoprolol tartrate (LOPRESSOR) 50 MG tablet Take 1 tablet by mouth 2 (Two) Times a Day. 180 tablet 2   • O2 (OXYGEN) Inhale 3 L/min Daily.     • tiZANidine (ZANAFLEX) 4 MG tablet Take 4 mg by mouth Every Night.     • traZODone (DESYREL) 100 MG tablet Take 100 mg by mouth Every Night.     • warfarin (COUMADIN) 5 MG tablet MWF & Sunday 5mg  TT & Saturday 2.5 mg 90 tablet 2   • clopidogrel (PLAVIX) 75 MG tablet Take 1 tablet by mouth Daily. 90 tablet 3     No current facility-administered medications for this visit.       Capsaicin, Crestor [rosuvastatin calcium], Demerol [meperidine], Ibuprofen, and Lipitor [atorvastatin]    Past Medical History:   Diagnosis Date   • Aneurysm (HCC)      clipping   • Anxiety    •  "COPD (chronic obstructive pulmonary disease) (HCC)    • CVA (cerebral vascular accident) (HCC)    • Depression    • Diabetes mellitus (HCC)    • GERD (gastroesophageal reflux disease)    • Hx of appendectomy 2017   • Hypercholesterolemia    • Hypertension    • Mechanical heart valve present    • Myocardial infarction (HCC)        Social History     Socioeconomic History   • Marital status:    Tobacco Use   • Smoking status: Former Smoker     Quit date:      Years since quittin.3   • Smokeless tobacco: Never Used   • Tobacco comment: Quit 6 years ago   Vaping Use   • Vaping Use: Never used   Substance and Sexual Activity   • Alcohol use: Not Currently   • Drug use: No       Family History   Problem Relation Age of Onset   • COPD Mother        Review of Systems   Constitutional: Positive for malaise/fatigue. Negative for night sweats.   Cardiovascular: Negative for chest pain, claudication, dyspnea on exertion, irregular heartbeat, leg swelling, near-syncope, orthopnea, palpitations and syncope.   Respiratory: Positive for shortness of breath. Negative for cough and wheezing.    Musculoskeletal: Positive for stiffness. Negative for back pain, joint pain and joint swelling.   Gastrointestinal: Negative for anorexia, heartburn, melena, nausea and vomiting.   Genitourinary: Negative for dysuria, hematuria, hesitancy and nocturia.   Neurological: Negative for dizziness, headaches and light-headedness.   Psychiatric/Behavioral: Negative for depression and memory loss. The patient is not nervous/anxious.            Objective     /64 (BP Location: Left arm, Patient Position: Sitting)   Pulse 78   Ht 154.9 cm (61\")   Wt 60.3 kg (133 lb)   BMI 25.13 kg/m²     Constitutional:       Appearance: Frail.   Eyes:      Pupils: Pupils are equal, round, and reactive to light.   HENT:      Nose: Nose normal.   Pulmonary:      Effort: Pulmonary effort is normal.      Breath sounds: Normal breath sounds. "   Cardiovascular:      PMI at left midclavicular line. Normal rate. Regular rhythm.      Murmurs: There is a systolic murmur.   Abdominal:      Palpations: Abdomen is soft.   Musculoskeletal: Normal range of motion.      Cervical back: Normal range of motion and neck supple. Skin:     General: Skin is warm.      Coloration: Skin is pale.   Neurological:      Mental Status: Alert and oriented to person, place and time.         Procedures    Assessment/Plan     HTN:  Blood pressure stable. No recent med list is available. We will continue with current metoprolol at 50mg BID. She will continue with current cozaar therapy at 50mg. If BP should run low, she will decrease to 25mg daily.     Cardiac status:  Stable. No new concerns are voiced. She denies any more chest pain since stenting. She was urged to continue with plavix therapy as well as her coumadin.  If bruise/bleed should be noted, she will be urged to hold ASA and continue with plavix and coumadin.      Murmur/AVR:  Most recent echo showed GEENA 0.98cm2, valve well seated. Repeat echo will be advised at next visit to reassess area. She will continue with coumadin for anticoagulation.     Hyperlipidemia:  Remains on statin with lipitor, zetia, and fenofibrate therapy and tolerates well. Side effects denied. Limited carbs, calories, fried/fatty food advised. FLP followed by your office, can we have for review?      SOA/COPD:  Continues to use supplemental oxygen therapy. Continues to be smoke free. Followed by pulmonary.      Ascending aneurysm:  Most recent CT of chest showed thoracic aneurysm      DM:  Insulin dependent. Followed by your office. She states AIC to be checked next visit. Can we have for our review?      BMI noted at 25.13, good cardiac ADA diet with limited carbs/calories and activity as tolerated. She was praised for weight loss efforts.     Refills per request.     Keep current follow up.       Problems Addressed this Visit        Cardiac and  Vasculature    S/P AVR    Relevant Medications    warfarin (COUMADIN) 5 MG tablet    Hyperlipemia    Relevant Medications    fenofibrate 160 MG tablet    ezetimibe (Zetia) 10 MG tablet    atorvastatin (LIPITOR) 40 MG tablet    Essential hypertension    Relevant Medications    losartan (Cozaar) 50 MG tablet    metoprolol tartrate (LOPRESSOR) 50 MG tablet       Endocrine and Metabolic    Type 2 diabetes mellitus with complication, with long-term current use of insulin (HCC)    Relevant Medications    metFORMIN (GLUCOPHAGE) 500 MG tablet    dapagliflozin (Farxiga) 5 MG tablet tablet       Pulmonary and Pneumonias    COPD mixed type (HCC)      Other Visit Diagnoses     Heart murmur    -  Primary    Thoracic aortic aneurysm without rupture (HCC)        Overweight          Diagnoses       Codes Comments    Heart murmur    -  Primary ICD-10-CM: R01.1  ICD-9-CM: 785.2     S/P AVR     ICD-10-CM: Z95.2  ICD-9-CM: V43.3     Essential hypertension     ICD-10-CM: I10  ICD-9-CM: 401.9     Mixed hyperlipidemia     ICD-10-CM: E78.2  ICD-9-CM: 272.2     Thoracic aortic aneurysm without rupture (HCC)     ICD-10-CM: I71.2  ICD-9-CM: 441.2     Type 2 diabetes mellitus with complication, with long-term current use of insulin (HCC)     ICD-10-CM: E11.8, Z79.4  ICD-9-CM: 250.90, V58.67     COPD mixed type (HCC)     ICD-10-CM: J44.9  ICD-9-CM: 496     Overweight     ICD-10-CM: E66.3  ICD-9-CM: 278.02           Patient's Body mass index is 25.13 kg/m². indicating that she is overweight. Good cardiac ADA diet with limited carbs, calories, and activity as tolerated advised.              Electronically signed by CHELSIE Chopra April 21, 2022 12:25 EDT

## 2022-04-22 ENCOUNTER — ANTICOAGULATION VISIT (OUTPATIENT)
Dept: CARDIOLOGY | Facility: CLINIC | Age: 62
End: 2022-04-22

## 2022-04-27 ENCOUNTER — LAB (OUTPATIENT)
Dept: LAB | Facility: HOSPITAL | Age: 62
End: 2022-04-27

## 2022-04-27 DIAGNOSIS — Z95.2 S/P AVR: ICD-10-CM

## 2022-04-27 DIAGNOSIS — Z79.01 LONG TERM (CURRENT) USE OF ANTICOAGULANTS: ICD-10-CM

## 2022-04-27 LAB
INR PPP: 1.01 (ref 0.9–1.1)
PROTHROMBIN TIME: 13.5 SECONDS (ref 12.1–14.7)

## 2022-04-27 PROCEDURE — 85610 PROTHROMBIN TIME: CPT

## 2022-04-27 PROCEDURE — 36415 COLL VENOUS BLD VENIPUNCTURE: CPT

## 2022-05-02 ENCOUNTER — LAB (OUTPATIENT)
Dept: LAB | Facility: HOSPITAL | Age: 62
End: 2022-05-02

## 2022-05-02 ENCOUNTER — ANTICOAGULATION VISIT (OUTPATIENT)
Dept: CARDIOLOGY | Facility: CLINIC | Age: 62
End: 2022-05-02

## 2022-05-02 DIAGNOSIS — Z79.01 LONG TERM (CURRENT) USE OF ANTICOAGULANTS: ICD-10-CM

## 2022-05-02 DIAGNOSIS — Z95.2 S/P AVR: ICD-10-CM

## 2022-05-02 LAB
INR PPP: 3.37 (ref 0.9–1.1)
PROTHROMBIN TIME: 35.2 SECONDS (ref 12.1–14.7)

## 2022-05-02 PROCEDURE — 85610 PROTHROMBIN TIME: CPT

## 2022-05-02 PROCEDURE — 36415 COLL VENOUS BLD VENIPUNCTURE: CPT

## 2022-05-03 ENCOUNTER — ANTICOAGULATION VISIT (OUTPATIENT)
Dept: CARDIOLOGY | Facility: CLINIC | Age: 62
End: 2022-05-03

## 2022-05-11 ENCOUNTER — LAB (OUTPATIENT)
Dept: LAB | Facility: HOSPITAL | Age: 62
End: 2022-05-11

## 2022-05-11 DIAGNOSIS — Z95.2 S/P AVR: ICD-10-CM

## 2022-05-11 DIAGNOSIS — Z79.01 LONG TERM (CURRENT) USE OF ANTICOAGULANTS: ICD-10-CM

## 2022-05-11 LAB
INR PPP: 3.28 (ref 0.9–1.1)
PROTHROMBIN TIME: 34.4 SECONDS (ref 12.1–14.7)

## 2022-05-11 PROCEDURE — 85610 PROTHROMBIN TIME: CPT

## 2022-05-11 PROCEDURE — 36415 COLL VENOUS BLD VENIPUNCTURE: CPT

## 2022-05-12 ENCOUNTER — ANTICOAGULATION VISIT (OUTPATIENT)
Dept: CARDIOLOGY | Facility: CLINIC | Age: 62
End: 2022-05-12

## 2022-05-31 ENCOUNTER — ANTICOAGULATION VISIT (OUTPATIENT)
Dept: CARDIOLOGY | Facility: CLINIC | Age: 62
End: 2022-05-31

## 2022-05-31 ENCOUNTER — LAB (OUTPATIENT)
Dept: LAB | Facility: HOSPITAL | Age: 62
End: 2022-05-31

## 2022-05-31 DIAGNOSIS — Z79.01 LONG TERM (CURRENT) USE OF ANTICOAGULANTS: ICD-10-CM

## 2022-05-31 DIAGNOSIS — Z95.2 S/P AVR: ICD-10-CM

## 2022-05-31 LAB
INR PPP: 5.45 (ref 0.9–1.1)
PROTHROMBIN TIME: 51.5 SECONDS (ref 12.1–14.7)

## 2022-05-31 PROCEDURE — 36415 COLL VENOUS BLD VENIPUNCTURE: CPT

## 2022-05-31 PROCEDURE — 85610 PROTHROMBIN TIME: CPT

## 2022-06-15 ENCOUNTER — LAB (OUTPATIENT)
Dept: LAB | Facility: HOSPITAL | Age: 62
End: 2022-06-15

## 2022-06-15 DIAGNOSIS — Z79.01 LONG TERM (CURRENT) USE OF ANTICOAGULANTS: ICD-10-CM

## 2022-06-15 DIAGNOSIS — Z95.2 S/P AVR: ICD-10-CM

## 2022-06-15 LAB
INR PPP: 3.2 (ref 0.9–1.1)
PROTHROMBIN TIME: 33.8 SECONDS (ref 12.1–14.7)

## 2022-06-15 PROCEDURE — 85610 PROTHROMBIN TIME: CPT

## 2022-06-15 PROCEDURE — 36415 COLL VENOUS BLD VENIPUNCTURE: CPT

## 2022-06-20 ENCOUNTER — ANTICOAGULATION VISIT (OUTPATIENT)
Dept: CARDIOLOGY | Facility: CLINIC | Age: 62
End: 2022-06-20

## 2022-08-17 ENCOUNTER — OFFICE VISIT (OUTPATIENT)
Dept: CARDIOLOGY | Facility: CLINIC | Age: 62
End: 2022-08-17

## 2022-08-17 ENCOUNTER — LAB (OUTPATIENT)
Dept: LAB | Facility: HOSPITAL | Age: 62
End: 2022-08-17

## 2022-08-17 VITALS
DIASTOLIC BLOOD PRESSURE: 66 MMHG | BODY MASS INDEX: 23.6 KG/M2 | SYSTOLIC BLOOD PRESSURE: 104 MMHG | HEART RATE: 68 BPM | HEIGHT: 61 IN | WEIGHT: 125 LBS

## 2022-08-17 DIAGNOSIS — E78.2 MIXED HYPERLIPIDEMIA: ICD-10-CM

## 2022-08-17 DIAGNOSIS — I10 ESSENTIAL HYPERTENSION: ICD-10-CM

## 2022-08-17 DIAGNOSIS — Z99.81 SUPPLEMENTAL OXYGEN DEPENDENT: ICD-10-CM

## 2022-08-17 DIAGNOSIS — J44.9 COPD MIXED TYPE: ICD-10-CM

## 2022-08-17 DIAGNOSIS — R01.1 HEART MURMUR: Primary | ICD-10-CM

## 2022-08-17 DIAGNOSIS — Z95.2 S/P AVR: ICD-10-CM

## 2022-08-17 DIAGNOSIS — E11.8 TYPE 2 DIABETES MELLITUS WITH COMPLICATION, WITH LONG-TERM CURRENT USE OF INSULIN: ICD-10-CM

## 2022-08-17 DIAGNOSIS — Z79.01 LONG TERM (CURRENT) USE OF ANTICOAGULANTS: ICD-10-CM

## 2022-08-17 DIAGNOSIS — I71.20 THORACIC AORTIC ANEURYSM, WITHOUT RUPTURE: ICD-10-CM

## 2022-08-17 DIAGNOSIS — I25.9 IHD (ISCHEMIC HEART DISEASE): ICD-10-CM

## 2022-08-17 DIAGNOSIS — Z79.4 TYPE 2 DIABETES MELLITUS WITH COMPLICATION, WITH LONG-TERM CURRENT USE OF INSULIN: ICD-10-CM

## 2022-08-17 LAB
INR PPP: 3.2 (ref 0.9–1.1)
PROTHROMBIN TIME: 33.7 SECONDS (ref 12.1–14.7)

## 2022-08-17 PROCEDURE — 85610 PROTHROMBIN TIME: CPT

## 2022-08-17 PROCEDURE — 36415 COLL VENOUS BLD VENIPUNCTURE: CPT

## 2022-08-17 PROCEDURE — 99214 OFFICE O/P EST MOD 30 MIN: CPT | Performed by: NURSE PRACTITIONER

## 2022-08-17 RX ORDER — FENOFIBRATE 160 MG/1
160 TABLET ORAL DAILY
Qty: 90 TABLET | Refills: 2 | Status: SHIPPED | OUTPATIENT
Start: 2022-08-17 | End: 2023-02-27 | Stop reason: SDUPTHER

## 2022-08-17 RX ORDER — METOPROLOL TARTRATE 50 MG/1
50 TABLET, FILM COATED ORAL 2 TIMES DAILY
Qty: 180 TABLET | Refills: 2 | Status: SHIPPED | OUTPATIENT
Start: 2022-08-17 | End: 2023-02-27 | Stop reason: SDUPTHER

## 2022-08-17 RX ORDER — INSULIN GLARGINE 100 [IU]/ML
INJECTION, SOLUTION SUBCUTANEOUS DAILY
COMMUNITY

## 2022-08-17 RX ORDER — NITROGLYCERIN 400 UG/1
1 SPRAY ORAL
Qty: 1 EACH | Refills: 1 | Status: SHIPPED | OUTPATIENT
Start: 2022-08-17

## 2022-08-17 RX ORDER — CLOPIDOGREL BISULFATE 75 MG/1
75 TABLET ORAL DAILY
Qty: 90 TABLET | Refills: 3 | Status: SHIPPED | OUTPATIENT
Start: 2022-08-17 | End: 2023-02-09 | Stop reason: SDUPTHER

## 2022-08-17 RX ORDER — PRAVASTATIN SODIUM 20 MG
20 TABLET ORAL DAILY
Qty: 90 TABLET | Refills: 2 | Status: SHIPPED | OUTPATIENT
Start: 2022-08-17 | End: 2023-02-27 | Stop reason: SDUPTHER

## 2022-08-17 RX ORDER — OXYBUTYNIN CHLORIDE 10 MG/1
10 TABLET, EXTENDED RELEASE ORAL DAILY
COMMUNITY

## 2022-08-17 RX ORDER — WARFARIN SODIUM 5 MG/1
TABLET ORAL
Qty: 90 TABLET | Refills: 2 | Status: SHIPPED | OUTPATIENT
Start: 2022-08-17 | End: 2023-02-27 | Stop reason: SDUPTHER

## 2022-08-17 RX ORDER — ESOMEPRAZOLE MAGNESIUM 40 MG/1
40 CAPSULE, DELAYED RELEASE ORAL
COMMUNITY
End: 2023-02-27

## 2022-08-17 RX ORDER — LOSARTAN POTASSIUM 25 MG/1
25 TABLET ORAL DAILY
Qty: 90 TABLET | Refills: 2 | Status: SHIPPED | OUTPATIENT
Start: 2022-08-17 | End: 2023-02-27 | Stop reason: DRUGHIGH

## 2022-08-17 RX ORDER — PRAVASTATIN SODIUM 20 MG
20 TABLET ORAL DAILY
COMMUNITY
End: 2022-08-17 | Stop reason: SDUPTHER

## 2022-08-17 RX ORDER — GLYBURIDE 5 MG/1
5 TABLET ORAL
COMMUNITY

## 2022-08-17 NOTE — PROGRESS NOTES
Chief Complaint   Patient presents with   • Follow-up     Pt is here for cardiac follow up.  She denies CP, SOB, dizziness or palpitations.  Pt does not take a daily ASA.   • Med Refill     Pt request 90 day refills to be sent to [facility].  Pt provided medication list   • Lab Work     Pt states her last labs were with her PCP a few months ago.       Cardiac Complaints  none      Subjective   Estefany Castañeda is a 61 y.o. female with hypertension, hyperlipidemia, diabetes, COPD with remote tobacco use, and bicuspid aortic valve, S/P aortic valve replacement in 1995.  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  She was referred in May 2016 for chest pain and shortness of breath. Cardiac workup done at that time showed no ischemia, normal LV function, and an aortic valve replacement that was well seated and functioning properly.  In December 2016, PCP called the office to report symptoms including chest pain. Repeat echo was done and no changes noted. She continues to be managed medically. She is anticoagulated with warfarin managed by PCP. She returned in May 2019 complaining of chest pain and shortness of breath, repeat cardiac workup was recommended. Stress and echo advised showed no ischemia, good LV function, and moderate AS with EGENA of 1.4, valve well seated and functioning properly. At last visit, she continued to have SOA and repeat echo advised. Most recent echo from 3/2020 showed GEENA at 1.2cm2 and EF of 60%. She did have concerns over AAA and US was advised, no AAA noted.  Most CT of the chest in 2021 showed ascending aneurysm of 4.2 cm, increased from 4.1cm prior.  She went to the hospital in Feb 2022 with chest pain, borderline elevated troponin. Cardiac cath was advised that showed 95% RCA stenosis and a single resolute stent was placed. Echo done at that time showed GEENA of 0.98cm2. CTA of chest showed  stable aneurysm of aortic root at 4.2cm.     She comes today for follow up and denies any new concerns. CP, SOA, dizziness, palpitations, and syncope denied. She does have some breathlessness, but no worse than prior, she remains on oxygen therapy daily.  She does admit to one instance a few months back with chest pain for which she used NTG spray for, she states one spray beneficial, has not used since. Labs with PCP a few months ago, no current available. Refills requested.          Cardiac History  Past Surgical History:   Procedure Laterality Date   • AORTIC VALVE REPAIR/REPLACEMENT  09/1995    [facility]    • CARDIAC CATHETERIZATION  07/18/2009    () Normal Coronaries. Acute takeoff of the LM.    • CARDIAC CATHETERIZATION  02/18/2022    95% RCA- 3.5x22 Resolute   • CARDIOVASCULAR STRESS TEST  05/19/2016    EF 65%, no ischemia   • CARDIOVASCULAR STRESS TEST  05/16/2019    L.Cardiolite- EF 65%. Negative.   • CARDIOVASCULAR STRESS TEST  04/28/2021    Lexiscan- EF 58%. BP- 170/76. Apical Infarct Vs Thinning   • ECHO - CONVERTED  05/19/2016    EF 50-55%, mod AS, AVR well seated and well functioning   • ECHO - CONVERTED  12/08/2016    EF 55-60%. GEENA- 1.5 Cm2. Gr- 34 mmHg. RVSP 23 mmHg   • ECHO - CONVERTED  05/16/2019    EF 65%. AVR. GEENA- 1.4 Cm2. Gr- 25 mmHg. Mild MR. RVSP- 24 mmHg.   • ECHO - CONVERTED  03/25/2020    EF 60%. AVR. GEENA- 1.2 Cm2. 17/29 mmHg. Mild MR & AI.   • ECHO - CONVERTED  04/28/2021    EF 60%. AVR. GEENA- 1.1 Cm2. 12/23 mmHg. Mild MR. RVSP- 24 mmHg   • ECHO - CONVERTED  02/02/2022    EF 60%. AVR, GEENA- 0.98. 16/30. Mild MR & AI. AO- 4.3    • OTHER SURGICAL HISTORY  02/17/2022    CTA chest:  stable 4.2cm thoracic aneurysm   • US CAROTID UNILATERAL  11/17/2016    bilateral- no hemodynamically significant stenosis       Current Outpatient Medications   Medication Sig Dispense Refill   • cholecalciferol (VITAMIN D3) 1000 units tablet Take 1,000 Units by mouth Daily.     • clopidogrel (PLAVIX) 75 MG  tablet Take 1 tablet by mouth Daily. 90 tablet 3   • diphenhydrAMINE (BENADRYL) 25 MG tablet Take 50 mg by mouth 2 (Two) Times a Day.     • escitalopram (LEXAPRO) 20 MG tablet Take 20 mg by mouth Daily.     • esomeprazole (nexIUM) 40 MG capsule Take 40 mg by mouth Every Morning Before Breakfast.     • fenofibrate 160 MG tablet Take 1 tablet by mouth Daily. 90 tablet 2   • Fluticasone-Umeclidin-Vilant (Trelegy Ellipta) 100-62.5-25 MCG/INH aerosol powder  Inhale 1 puff.     • gabapentin (NEURONTIN) 800 MG tablet Take 800 mg by mouth 3 (Three) Times a Day.     • glyburide (DIAbeta) 5 MG tablet Take 5 mg by mouth Daily With Breakfast.     • insulin glargine (LANTUS, SEMGLEE) 100 UNIT/ML injection Inject  under the skin into the appropriate area as directed Daily.     • insulin regular (humuLIN R,novoLIN R) 100 UNIT/ML injection Inject  under the skin into the appropriate area as directed 3 (Three) Times a Day Before Meals.     • losartan (Cozaar) 25 MG tablet Take 1 tablet by mouth Daily. 90 tablet 2   • magnesium oxide (MAG-OX) 400 MG tablet Take 1 tablet by mouth Daily. 30 tablet 6   • metoprolol tartrate (LOPRESSOR) 50 MG tablet Take 1 tablet by mouth 2 (Two) Times a Day. 180 tablet 2   • O2 (OXYGEN) Inhale 3 L/min Daily.     • oxybutynin XL (DITROPAN-XL) 10 MG 24 hr tablet Take 10 mg by mouth Daily.     • pravastatin (PRAVACHOL) 20 MG tablet Take 1 tablet by mouth Daily. 90 tablet 2   • tiZANidine (ZANAFLEX) 4 MG tablet Take 4 mg by mouth Every Night.     • traZODone (DESYREL) 100 MG tablet Take 100 mg by mouth Every Night.     • warfarin (COUMADIN) 5 MG tablet MWF & Sunday 5mg  TT & Saturday 2.5 mg 90 tablet 2   • nitroglycerin (Nitrolingual) 0.4 MG/SPRAY spray Place 1 spray under the tongue Every 5 (Five) Minutes As Needed for Chest Pain. 1 each 1     No current facility-administered medications for this visit.       Capsaicin, Crestor [rosuvastatin calcium], Demerol [meperidine], Ibuprofen, and Lipitor  "[atorvastatin]    Past Medical History:   Diagnosis Date   • Aneurysm (HCC)      clipping   • Anxiety    • COPD (chronic obstructive pulmonary disease) (HCC)    • CVA (cerebral vascular accident) (HCC)    • Depression    • Diabetes mellitus (HCC)    • GERD (gastroesophageal reflux disease)    • Hx of appendectomy 2017   • Hypercholesterolemia    • Hypertension    • Mechanical heart valve present    • Myocardial infarction (HCC)        Social History     Socioeconomic History   • Marital status:    Tobacco Use   • Smoking status: Former Smoker     Quit date:      Years since quittin.6   • Smokeless tobacco: Never Used   • Tobacco comment: Quit 6 years ago   Vaping Use   • Vaping Use: Never used   Substance and Sexual Activity   • Alcohol use: Not Currently   • Drug use: No       Family History   Problem Relation Age of Onset   • COPD Mother        Review of Systems   Constitutional: Negative for malaise/fatigue and night sweats.   Cardiovascular: Positive for dyspnea on exertion. Negative for chest pain, claudication, irregular heartbeat, leg swelling, near-syncope, orthopnea, palpitations and syncope.   Respiratory: Positive for shortness of breath. Negative for cough and wheezing.    Musculoskeletal: Positive for stiffness. Negative for back pain and joint pain.   Gastrointestinal: Negative for anorexia, heartburn, melena, nausea and vomiting.   Genitourinary: Negative for dysuria, hematuria, hesitancy and nocturia.   Neurological: Negative for dizziness, headaches and light-headedness.   Psychiatric/Behavioral: Negative for depression and memory loss. The patient is not nervous/anxious.            Objective     /66 (BP Location: Left arm, Patient Position: Sitting)   Pulse 68   Ht 154.9 cm (61\")   Wt 56.7 kg (125 lb)   BMI 23.62 kg/m²     Constitutional:       Appearance: Not in distress.   Eyes:      Pupils: Pupils are equal, round, and reactive to light.   HENT:      Nose: Nose " normal.   Pulmonary:      Effort: Pulmonary effort is normal.      Breath sounds: Normal breath sounds.   Cardiovascular:      PMI at left midclavicular line. Normal rate. Regular rhythm.      Murmurs: There is a systolic murmur.   Abdominal:      Palpations: Abdomen is soft.   Musculoskeletal: Normal range of motion.      Cervical back: Normal range of motion and neck supple. Skin:     General: Skin is warm and dry.   Neurological:      Mental Status: Alert.         Procedures         Diagnoses and all orders for this visit:    1. Heart murmur (Primary)  -     Adult Transthoracic Echo Complete W/ Cont if Necessary Per Protocol; Future    2. S/P AVR  -     warfarin (COUMADIN) 5 MG tablet; MWF & Sunday 5mg  TT & Saturday 2.5 mg  Dispense: 90 tablet; Refill: 2  -     Adult Transthoracic Echo Complete W/ Cont if Necessary Per Protocol; Future    3. IHD (ischemic heart disease)    4. Essential hypertension    5. Mixed hyperlipidemia    6. Thoracic aortic aneurysm, without rupture ([facility])    7. COPD mixed type ([facility])    8. Type 2 diabetes mellitus with complication, with long-term current use of insulin ([facility])    9. Supplemental oxygen dependent    10. Long term (current) use of anticoagulants    Other orders  -     nitroglycerin (Nitrolingual) 0.4 MG/SPRAY spray; Place 1 spray under the tongue Every 5 (Five) Minutes As Needed for Chest Pain.  Dispense: 1 each; Refill: 1  -     clopidogrel (PLAVIX) 75 MG tablet; Take 1 tablet by mouth Daily.  Dispense: 90 tablet; Refill: 3  -     fenofibrate 160 MG tablet; Take 1 tablet by mouth Daily.  Dispense: 90 tablet; Refill: 2  -     losartan (Cozaar) 25 MG tablet; Take 1 tablet by mouth Daily.  Dispense: 90 tablet; Refill: 2  -     metoprolol tartrate (LOPRESSOR) 50 MG tablet; Take 1 tablet by mouth 2 (Two) Times a Day.  Dispense: 180 tablet; Refill: 2  -     pravastatin (PRAVACHOL) 20 MG tablet; Take 1 tablet by mouth Daily.  Dispense: 90 tablet; Refill: 2             HTN:  Blood  pressure stable.  We will continue with current metoprolol at 50mg BID and losartan at 25mg daily.     Cardiac status:  Stable. No new concerns are voiced. She denies any more chest pain since stenting. She was urged to continue with plavix therapy as well as her coumadin. Bleed denied.     Murmur/AVR:  Most recent echo showed GEENA 0.98cm2, valve well seated. Repeat echo will be advised to reassess. She will continue with coumadin for anticoagulation.     Hyperlipidemia:  Remains on statin with lipitor, zetia, and fenofibrate therapy and tolerates well. Side effects denied. Limited carbs, calories, fried/fatty food advised. FLP followed by your office, can we have recent for review?      SOA/COPD:  Continues to use supplemental oxygen therapy. Continues to be smoke free. Followed by pulmonary.      Ascending aneurysm:  Most recent CT of chest showed thoracic aneurysm stable, will consider repeat at next visit.     DM:  Insulin dependent. Followed by your office. She states AIC to be checked next visit. Can we have for our review?      BMI noted at 23.62, good cardiac ADA diet with limited carbs/calories and activity as tolerated. She was praised for weight loss efforts.     Refills per request.     6 month follow up advised.       Problems Addressed this Visit        Cardiac and Vasculature    S/P AVR    Relevant Medications    warfarin (COUMADIN) 5 MG tablet    Other Relevant Orders    Adult Transthoracic Echo Complete W/ Cont if Necessary Per Protocol    Hyperlipemia    Relevant Medications    fenofibrate 160 MG tablet    pravastatin (PRAVACHOL) 20 MG tablet    Essential hypertension    Relevant Medications    losartan (Cozaar) 25 MG tablet    metoprolol tartrate (LOPRESSOR) 50 MG tablet       Endocrine and Metabolic    Type 2 diabetes mellitus with complication, with long-term current use of insulin (HCC)    Relevant Medications    glyburide (DIAbeta) 5 MG tablet    insulin glargine (LANTUS, SEMGLEE) 100 UNIT/ML  injection       Pulmonary and Pneumonias    COPD mixed type (HCC)      Other Visit Diagnoses     Heart murmur    -  Primary    Relevant Orders    Adult Transthoracic Echo Complete W/ Cont if Necessary Per Protocol    IHD (ischemic heart disease)        Relevant Medications    nitroglycerin (Nitrolingual) 0.4 MG/SPRAY spray    clopidogrel (PLAVIX) 75 MG tablet    metoprolol tartrate (LOPRESSOR) 50 MG tablet    Thoracic aortic aneurysm, without rupture (HCC)        Supplemental oxygen dependent        Long term (current) use of anticoagulants          Diagnoses       Codes Comments    Heart murmur    -  Primary ICD-10-CM: R01.1  ICD-9-CM: 785.2     S/P AVR     ICD-10-CM: Z95.2  ICD-9-CM: V43.3     IHD (ischemic heart disease)     ICD-10-CM: I25.9  ICD-9-CM: 414.9     Essential hypertension     ICD-10-CM: I10  ICD-9-CM: 401.9     Mixed hyperlipidemia     ICD-10-CM: E78.2  ICD-9-CM: 272.2     Thoracic aortic aneurysm, without rupture (HCC)     ICD-10-CM: I71.2  ICD-9-CM: 441.2     COPD mixed type (HCC)     ICD-10-CM: J44.9  ICD-9-CM: 496     Type 2 diabetes mellitus with complication, with long-term current use of insulin (HCC)     ICD-10-CM: E11.8, Z79.4  ICD-9-CM: 250.90, V58.67     Supplemental oxygen dependent     ICD-10-CM: Z99.81  ICD-9-CM: V46.2     Long term (current) use of anticoagulants     ICD-10-CM: Z79.01  ICD-9-CM: V58.61                        Electronically signed by CHELSIE Chopra August 17, 2022 16:15 EDT

## 2022-08-18 ENCOUNTER — ANTICOAGULATION VISIT (OUTPATIENT)
Dept: CARDIOLOGY | Facility: CLINIC | Age: 62
End: 2022-08-18

## 2022-09-14 ENCOUNTER — OUTSIDE FACILITY SERVICE (OUTPATIENT)
Dept: CARDIOLOGY | Facility: CLINIC | Age: 62
End: 2022-09-14

## 2022-09-14 PROCEDURE — 99223 1ST HOSP IP/OBS HIGH 75: CPT | Performed by: INTERNAL MEDICINE

## 2022-09-14 PROCEDURE — 93306 TTE W/DOPPLER COMPLETE: CPT | Performed by: INTERNAL MEDICINE

## 2022-09-15 ENCOUNTER — OUTSIDE FACILITY SERVICE (OUTPATIENT)
Dept: CARDIOLOGY | Facility: CLINIC | Age: 62
End: 2022-09-15

## 2022-09-15 PROCEDURE — 99232 SBSQ HOSP IP/OBS MODERATE 35: CPT | Performed by: INTERNAL MEDICINE

## 2022-09-15 PROCEDURE — 93018 CV STRESS TEST I&R ONLY: CPT | Performed by: INTERNAL MEDICINE

## 2022-09-15 PROCEDURE — 78452 HT MUSCLE IMAGE SPECT MULT: CPT | Performed by: INTERNAL MEDICINE

## 2022-09-16 ENCOUNTER — OUTSIDE FACILITY SERVICE (OUTPATIENT)
Dept: CARDIOLOGY | Facility: CLINIC | Age: 62
End: 2022-09-16

## 2022-09-16 DIAGNOSIS — Z95.2 S/P AVR: Primary | ICD-10-CM

## 2022-09-16 DIAGNOSIS — I35.0 AORTIC STENOSIS, SEVERE: ICD-10-CM

## 2022-09-16 PROCEDURE — 93458 L HRT ARTERY/VENTRICLE ANGIO: CPT | Performed by: INTERNAL MEDICINE

## 2022-09-16 PROCEDURE — 99232 SBSQ HOSP IP/OBS MODERATE 35: CPT | Performed by: INTERNAL MEDICINE

## 2022-09-17 ENCOUNTER — OUTSIDE FACILITY SERVICE (OUTPATIENT)
Dept: CARDIOLOGY | Facility: CLINIC | Age: 62
End: 2022-09-17

## 2022-09-17 PROCEDURE — 99232 SBSQ HOSP IP/OBS MODERATE 35: CPT | Performed by: INTERNAL MEDICINE

## 2022-09-18 ENCOUNTER — OUTSIDE FACILITY SERVICE (OUTPATIENT)
Dept: CARDIOLOGY | Facility: CLINIC | Age: 62
End: 2022-09-18

## 2022-09-18 PROCEDURE — 99232 SBSQ HOSP IP/OBS MODERATE 35: CPT | Performed by: INTERNAL MEDICINE

## 2022-09-19 ENCOUNTER — OUTSIDE FACILITY SERVICE (OUTPATIENT)
Dept: CARDIOLOGY | Facility: CLINIC | Age: 62
End: 2022-09-19

## 2022-09-19 ENCOUNTER — TELEPHONE (OUTPATIENT)
Dept: CARDIOLOGY | Facility: CLINIC | Age: 62
End: 2022-09-19

## 2022-09-19 PROCEDURE — 99232 SBSQ HOSP IP/OBS MODERATE 35: CPT | Performed by: INTERNAL MEDICINE

## 2022-09-19 NOTE — TELEPHONE ENCOUNTER
I made patient's consult appointment with Dr. Lynn.  Scheduled 9/22/22 at 11:20 pm.  I called patient and she is still in [facility].  Do I need to reschedule consult for a later time?    Dr. Lynn's office also asked me if echo film was mailed also?

## 2022-09-20 ENCOUNTER — OUTSIDE FACILITY SERVICE (OUTPATIENT)
Dept: CARDIOLOGY | Facility: CLINIC | Age: 62
End: 2022-09-20

## 2022-09-20 PROCEDURE — 99232 SBSQ HOSP IP/OBS MODERATE 35: CPT | Performed by: INTERNAL MEDICINE

## 2022-09-20 NOTE — TELEPHONE ENCOUNTER
Patient aware of appointment date and time.  She is being discharged today from [facility].  I advised patient I will call her this afternoon, once she is home and give her Dr. Lynn's address for her consultation.    Consult with Dr. Lynn on 9/22/22 at 11:20 am EST. [address], [address], [place]. Parking on [address].

## 2022-09-20 NOTE — TELEPHONE ENCOUNTER
I spoke with patient's , Tor.  He said Dr. Lynn's nurse contacted them and he is aware of location of appointment.  He said they did reschedule to next week.

## 2022-09-21 ENCOUNTER — TELEPHONE (OUTPATIENT)
Dept: CARDIOLOGY | Facility: CLINIC | Age: 62
End: 2022-09-21

## 2022-09-21 NOTE — TELEPHONE ENCOUNTER
Spoke with patient. INR checked at ER last night was 1.09. Pt reports no bleeding from her PPM site. Aware to take Coumadin 5 mg 1 tab today, tomorrow and Friday, 1/2 tab Sat Sun and recheck Monday morning.

## 2022-10-10 ENCOUNTER — TELEPHONE (OUTPATIENT)
Dept: CARDIOLOGY | Facility: CLINIC | Age: 62
End: 2022-10-10

## 2022-10-10 NOTE — TELEPHONE ENCOUNTER
Called pt to remind her to have INR checked. She said she is going tomorrow, they had been out of gas and no way to get to lab. Discussed having home monitoring set up thru [facility]. She would like to proceed.

## 2022-10-11 ENCOUNTER — TELEPHONE (OUTPATIENT)
Dept: CARDIOLOGY | Facility: CLINIC | Age: 62
End: 2022-10-11

## 2022-10-11 ENCOUNTER — LAB (OUTPATIENT)
Dept: LAB | Facility: HOSPITAL | Age: 62
End: 2022-10-11

## 2022-10-11 DIAGNOSIS — Z95.2 S/P AVR: ICD-10-CM

## 2022-10-11 DIAGNOSIS — Z79.01 LONG TERM (CURRENT) USE OF ANTICOAGULANTS: Primary | ICD-10-CM

## 2022-10-11 DIAGNOSIS — Z79.01 LONG TERM (CURRENT) USE OF ANTICOAGULANTS: ICD-10-CM

## 2022-10-11 LAB
INR PPP: 3
INR PPP: 3 (ref 0.9–1.1)
PROTHROMBIN TIME: 32.1 SECONDS (ref 12.1–14.7)

## 2022-10-11 PROCEDURE — 85610 PROTHROMBIN TIME: CPT

## 2022-10-11 PROCEDURE — 36415 COLL VENOUS BLD VENIPUNCTURE: CPT

## 2022-10-12 ENCOUNTER — ANTICOAGULATION VISIT (OUTPATIENT)
Dept: CARDIOLOGY | Facility: CLINIC | Age: 62
End: 2022-10-12

## 2022-10-30 PROCEDURE — 93296 REM INTERROG EVL PM/IDS: CPT | Performed by: INTERNAL MEDICINE

## 2022-10-30 PROCEDURE — 93294 REM INTERROG EVL PM/LDLS PM: CPT | Performed by: INTERNAL MEDICINE

## 2022-11-04 ENCOUNTER — TELEPHONE (OUTPATIENT)
Dept: CARDIOLOGY | Facility: CLINIC | Age: 62
End: 2022-11-04

## 2022-11-04 DIAGNOSIS — Z79.01 LONG TERM (CURRENT) USE OF ANTICOAGULANTS: ICD-10-CM

## 2022-11-04 DIAGNOSIS — Z95.2 S/P AVR: Primary | ICD-10-CM

## 2022-11-21 ENCOUNTER — ANTICOAGULATION VISIT (OUTPATIENT)
Dept: CARDIOLOGY | Facility: CLINIC | Age: 62
End: 2022-11-21

## 2022-11-21 LAB — INR PPP: 1.7

## 2022-11-25 LAB — INR PPP: 3.8

## 2022-11-29 ENCOUNTER — ANTICOAGULATION VISIT (OUTPATIENT)
Dept: CARDIOLOGY | Facility: CLINIC | Age: 62
End: 2022-11-29

## 2022-12-07 ENCOUNTER — TELEPHONE (OUTPATIENT)
Dept: CARDIOLOGY | Facility: CLINIC | Age: 62
End: 2022-12-07

## 2022-12-07 ENCOUNTER — ANTICOAGULATION VISIT (OUTPATIENT)
Dept: CARDIOLOGY | Facility: CLINIC | Age: 62
End: 2022-12-07

## 2022-12-07 LAB — INR PPP: 4.6

## 2022-12-21 LAB — INR PPP: 3.6

## 2022-12-22 ENCOUNTER — ANTICOAGULATION VISIT (OUTPATIENT)
Dept: CARDIOLOGY | Facility: CLINIC | Age: 62
End: 2022-12-22

## 2023-01-09 LAB — INR PPP: 2.6

## 2023-01-18 ENCOUNTER — TELEPHONE (OUTPATIENT)
Dept: CARDIOLOGY | Facility: CLINIC | Age: 63
End: 2023-01-18

## 2023-01-18 ENCOUNTER — ANTICOAGULATION VISIT (OUTPATIENT)
Dept: CARDIOLOGY | Facility: CLINIC | Age: 63
End: 2023-01-18
Payer: MEDICARE

## 2023-01-18 LAB
INR PPP: 2.1
INR PPP: 2.1

## 2023-01-18 NOTE — TELEPHONE ENCOUNTER
Spoke with patient to adjust warfarin. Pt reports taking 5 mg daily x3 mg 2 days a week.  Not her normal dosing, confirmed she does have 5 mg tabs and 3 mg tabs. Aware to increase to 5 mg daily x 3 mg one day a week. Recheck in one week.

## 2023-01-18 NOTE — TELEPHONE ENCOUNTER
Caller: Estefany Castañeda    Relationship: Self    Best call back number: [phone]     What is the best time to reach you: ANY    Who are you requesting to speak with (clinical staff, provider,  specific staff member): INR CLINICAL STAFF    Do you know the name of the person who called: PT    What was the call regarding: INR 2.1    Do you require a callback: IF NEEDED

## 2023-01-30 ENCOUNTER — TELEPHONE (OUTPATIENT)
Dept: CARDIOLOGY | Facility: CLINIC | Age: 63
End: 2023-01-30
Payer: MEDICARE

## 2023-01-30 NOTE — TELEPHONE ENCOUNTER
Caller: Estefany Castañeda    Relationship to patient: Self    Best call back number: [phone]    Patient is needing:  PTS INR IS 3.2

## 2023-01-31 LAB — INR PPP: 3.2

## 2023-02-09 ENCOUNTER — TELEPHONE (OUTPATIENT)
Dept: CARDIOLOGY | Facility: CLINIC | Age: 63
End: 2023-02-09
Payer: MEDICARE

## 2023-02-09 LAB — INR PPP: 6.3

## 2023-02-09 RX ORDER — CLOPIDOGREL BISULFATE 75 MG/1
75 TABLET ORAL DAILY
Qty: 90 TABLET | Refills: 3 | Status: SHIPPED | OUTPATIENT
Start: 2023-02-09 | End: 2023-02-27 | Stop reason: SDUPTHER

## 2023-02-09 NOTE — TELEPHONE ENCOUNTER
Caller: Estefany Castañeda    Relationship: Self    Best call back number: [phone]    What was the call regarding: PT CALLING IN TO REPORT AN INR OF 6.3. PT STATES IT IS SUPPOSED TO BE BETWEEN 2-3.     Do you require a callback: YES

## 2023-02-20 ENCOUNTER — LAB (OUTPATIENT)
Dept: LAB | Facility: HOSPITAL | Age: 63
End: 2023-02-20
Payer: MEDICARE

## 2023-02-20 ENCOUNTER — ANTICOAGULATION VISIT (OUTPATIENT)
Dept: CARDIOLOGY | Facility: CLINIC | Age: 63
End: 2023-02-20
Payer: MEDICARE

## 2023-02-20 LAB
INR PPP: 1.7
INR PPP: 1.76 (ref 0.9–1.1)
PROTHROMBIN TIME: 21 SECONDS (ref 12.1–14.7)

## 2023-02-20 PROCEDURE — 85610 PROTHROMBIN TIME: CPT | Performed by: NURSE PRACTITIONER

## 2023-02-21 ENCOUNTER — ANTICOAGULATION VISIT (OUTPATIENT)
Dept: CARDIOLOGY | Facility: CLINIC | Age: 63
End: 2023-02-21
Payer: MEDICARE

## 2023-02-22 ENCOUNTER — ANTICOAGULATION VISIT (OUTPATIENT)
Dept: CARDIOLOGY | Facility: CLINIC | Age: 63
End: 2023-02-22
Payer: MEDICARE

## 2023-02-22 ENCOUNTER — LAB (OUTPATIENT)
Dept: LAB | Facility: HOSPITAL | Age: 63
End: 2023-02-22
Payer: MEDICARE

## 2023-02-22 ENCOUNTER — TELEPHONE (OUTPATIENT)
Dept: CARDIOLOGY | Facility: CLINIC | Age: 63
End: 2023-02-22
Payer: MEDICARE

## 2023-02-22 DIAGNOSIS — Z79.01 LONG TERM (CURRENT) USE OF ANTICOAGULANTS: Primary | ICD-10-CM

## 2023-02-22 DIAGNOSIS — Z95.2 S/P AVR: ICD-10-CM

## 2023-02-22 DIAGNOSIS — Z79.01 LONG TERM (CURRENT) USE OF ANTICOAGULANTS: ICD-10-CM

## 2023-02-22 LAB
INR PPP: 7.94 (ref 0.9–1.1)
PROTHROMBIN TIME: 69.3 SECONDS (ref 12.1–14.7)

## 2023-02-22 PROCEDURE — 85610 PROTHROMBIN TIME: CPT | Performed by: NURSE PRACTITIONER

## 2023-02-22 NOTE — TELEPHONE ENCOUNTER
Spoke with patient, she was aware to not take Coumadin today or tomorrw, recheck Friday, to call if her home testing supplies have not came in yet, will send to  Primary Care. Order in for clinic collect if needed.

## 2023-02-22 NOTE — TELEPHONE ENCOUNTER
Lab called with a critical INR of 7.94, unable to notify pt. I called [facility] to advise that pt needs her supplies. They are going to reach out to her to see if they need to overnight supplies.

## 2023-02-22 NOTE — TELEPHONE ENCOUNTER
LM for pt advised to not take Coumadin for 2 days and to call me as soon as she gets this message.

## 2023-02-24 ENCOUNTER — LAB (OUTPATIENT)
Dept: FAMILY MEDICINE CLINIC | Facility: CLINIC | Age: 63
End: 2023-02-24
Payer: MEDICARE

## 2023-02-24 DIAGNOSIS — Z95.2 S/P AVR: ICD-10-CM

## 2023-02-24 DIAGNOSIS — Z79.01 LONG TERM (CURRENT) USE OF ANTICOAGULANTS: ICD-10-CM

## 2023-02-24 LAB
INR PPP: 2.86 (ref 0.9–1.1)
PROTHROMBIN TIME: 30.8 SECONDS (ref 12.1–14.7)

## 2023-02-24 PROCEDURE — 85610 PROTHROMBIN TIME: CPT | Performed by: NURSE PRACTITIONER

## 2023-02-27 ENCOUNTER — ANTICOAGULATION VISIT (OUTPATIENT)
Dept: CARDIOLOGY | Facility: CLINIC | Age: 63
End: 2023-02-27
Payer: MEDICARE

## 2023-02-27 ENCOUNTER — OFFICE VISIT (OUTPATIENT)
Dept: CARDIOLOGY | Facility: CLINIC | Age: 63
End: 2023-02-27
Payer: MEDICARE

## 2023-02-27 VITALS
HEART RATE: 71 BPM | BODY MASS INDEX: 23.07 KG/M2 | HEIGHT: 61 IN | SYSTOLIC BLOOD PRESSURE: 142 MMHG | WEIGHT: 122.2 LBS | DIASTOLIC BLOOD PRESSURE: 70 MMHG

## 2023-02-27 DIAGNOSIS — I49.5 SSS (SICK SINUS SYNDROME): ICD-10-CM

## 2023-02-27 DIAGNOSIS — E78.2 MIXED HYPERLIPIDEMIA: ICD-10-CM

## 2023-02-27 DIAGNOSIS — E11.8 TYPE 2 DIABETES MELLITUS WITH COMPLICATION, WITH LONG-TERM CURRENT USE OF INSULIN: ICD-10-CM

## 2023-02-27 DIAGNOSIS — R06.02 SHORTNESS OF BREATH: ICD-10-CM

## 2023-02-27 DIAGNOSIS — R01.1 HEART MURMUR: Primary | ICD-10-CM

## 2023-02-27 DIAGNOSIS — I71.21 ANEURYSM OF ASCENDING AORTA WITHOUT RUPTURE: ICD-10-CM

## 2023-02-27 DIAGNOSIS — J44.9 COPD MIXED TYPE: ICD-10-CM

## 2023-02-27 DIAGNOSIS — I10 ESSENTIAL HYPERTENSION: ICD-10-CM

## 2023-02-27 DIAGNOSIS — Z79.4 TYPE 2 DIABETES MELLITUS WITH COMPLICATION, WITH LONG-TERM CURRENT USE OF INSULIN: ICD-10-CM

## 2023-02-27 DIAGNOSIS — Z79.1 ENCOUNTER FOR LONG-TERM (CURRENT) USE OF NON-STEROIDAL ANTI-INFLAMMATORIES: ICD-10-CM

## 2023-02-27 DIAGNOSIS — Z95.2 S/P AVR: ICD-10-CM

## 2023-02-27 PROCEDURE — 99214 OFFICE O/P EST MOD 30 MIN: CPT | Performed by: NURSE PRACTITIONER

## 2023-02-27 PROCEDURE — 93000 ELECTROCARDIOGRAM COMPLETE: CPT | Performed by: NURSE PRACTITIONER

## 2023-02-27 RX ORDER — FENOFIBRATE 160 MG/1
160 TABLET ORAL DAILY
Qty: 90 TABLET | Refills: 2 | Status: SHIPPED | OUTPATIENT
Start: 2023-02-27

## 2023-02-27 RX ORDER — WARFARIN SODIUM 5 MG/1
TABLET ORAL
Qty: 90 TABLET | Refills: 2 | Status: SHIPPED | OUTPATIENT
Start: 2023-02-27

## 2023-02-27 RX ORDER — CLOPIDOGREL BISULFATE 75 MG/1
75 TABLET ORAL DAILY
Qty: 90 TABLET | Refills: 3 | Status: SHIPPED | OUTPATIENT
Start: 2023-02-27 | End: 2023-04-05

## 2023-02-27 RX ORDER — METOPROLOL TARTRATE 50 MG/1
50 TABLET, FILM COATED ORAL 2 TIMES DAILY
Qty: 180 TABLET | Refills: 2 | Status: SHIPPED | OUTPATIENT
Start: 2023-02-27

## 2023-02-27 RX ORDER — TIOTROPIUM BROMIDE INHALATION SPRAY 3.12 UG/1
SPRAY, METERED RESPIRATORY (INHALATION)
COMMUNITY
Start: 2023-02-13

## 2023-02-27 RX ORDER — METHOCARBAMOL 750 MG/1
TABLET, FILM COATED ORAL
COMMUNITY
Start: 2023-02-18

## 2023-02-27 RX ORDER — METFORMIN HYDROCHLORIDE 500 MG/1
500 TABLET, EXTENDED RELEASE ORAL
COMMUNITY
Start: 2022-11-18

## 2023-02-27 RX ORDER — LOSARTAN POTASSIUM 50 MG/1
50 TABLET ORAL DAILY
Qty: 90 TABLET | Refills: 2 | Status: SHIPPED | OUTPATIENT
Start: 2023-02-27

## 2023-02-27 RX ORDER — LOSARTAN POTASSIUM 50 MG/1
TABLET ORAL
COMMUNITY
Start: 2023-02-06 | End: 2023-02-27 | Stop reason: SDUPTHER

## 2023-02-27 RX ORDER — EZETIMIBE 10 MG/1
TABLET ORAL
COMMUNITY
Start: 2023-01-06 | End: 2023-04-05

## 2023-02-27 RX ORDER — PRAVASTATIN SODIUM 20 MG
20 TABLET ORAL DAILY
Qty: 90 TABLET | Refills: 2 | Status: SHIPPED | OUTPATIENT
Start: 2023-02-27

## 2023-02-27 NOTE — PROGRESS NOTES
Chief Complaint   Patient presents with   • Follow-up     Pt is here for cardiac follow up.  Pt denies CP, SOB, dizziness or palpitations.  Pt does not take a daily ASA.     • Med Refill     Pt request 90 day refills to be sent to [facility].  Medications were verified by the pt.     • Lab Work     Pt states their last labs were about 3 months ago with her PCP.         Cardiac Complaints  none      Subjective   Estefany Castañeda is a 62 y.o. female with SSS,  hypertension, IHD, hyperlipidemia, diabetes, COPD with remote tobacco use, and bicuspid aortic valve, S/P aortic valve replacement in 1995 redo 2023.  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  She was referred in May 2016 for chest pain and shortness of breath. Cardiac workup done at that time showed no ischemia, normal LV function, and an aortic valve replacement that was well seated and functioning properly.  In December 2016, PCP called the office to report symptoms including chest pain. Repeat echo was done and no changes noted. She continues to be managed medically. She is anticoagulated with warfarin managed by PCP. She returned in May 2019 complaining of chest pain and shortness of breath, repeat cardiac workup was recommended. Stress and echo advised showed no ischemia, good LV function, and moderate AS with GEENA of 1.4, valve well seated and functioning properly. At last visit, she continued to have SOA and repeat echo advised. Most recent echo from 3/2020 showed GEENA at 1.2cm2 and EF of 60%. She did have concerns over AAA and US was advised, no AAA noted.  Most CT of the chest in 2021 showed ascending aneurysm of 4.2 cm, increased from 4.1cm prior.  She went to the hospital in Feb 2022 with chest pain, borderline elevated troponin. Cardiac cath was advised that showed 95% RCA stenosis and a single resolute stent was placed. Echo done at that time showed  GEENA of 0.98cm2. Cardiac cath done 9/2022 showed RCA stenosis and a 2.5x8mm resolute stent placed to distal RCA and 4.0x22 resolute MAYELIN to prox RCA, complicated by heart block, for which a permanent SJM pacemaker was placed on 9/19/2022, pacemaker followed remotely, but has not been checked since October. CTA of chest showed stable aneurysm of aortic root at 4.2cm, she was then referred to  to Dr. Lynn for possible redo sternotomy aortic valve replacement and possible resection of ascending aortic aneurysm root replacement. She underwent CABG with SVG to PDA and mAVR on 11/7/2022 with Dr. Lynn. Labs from  in November showed: HH 8.8/27.4, BUN 35, Creatinine 057, , , K 4.3, Na 142. Recent INR 3 days ago at 2.86.     Comes today for follow up and reports doing well post surgery. She denies any CP, SOA, palpitations, dizziness, or syncope. Remains oxygen dependent on 2LNC. She remains anticoagulated on coumadin therapy for mechanical aortic valve, recently redone by Dr. Lynn, bleed and bruise denied. Labs followed 3 months ago with PCP, no current available. Refills requested.             Cardiac History  Past Surgical History:   Procedure Laterality Date   • AORTIC VALVE REPAIR/REPLACEMENT  09/1995    [facility]    • CARDIAC CATHETERIZATION  07/18/2009    () Normal Coronaries. Acute takeoff of the LM.    • CARDIAC CATHETERIZATION  02/18/2022    95% RCA- 3.5x22 Resolute   • CARDIAC CATHETERIZATION  09/16/2022    100% RCA Stent- 4.0x22 & 2.5x8 Resolute. AO Root dilated   • CARDIOVASCULAR STRESS TEST  05/19/2016    EF 65%, no ischemia   • CARDIOVASCULAR STRESS TEST  05/16/2019    L.Cardiolite- EF 65%. Negative.   • CARDIOVASCULAR STRESS TEST  04/28/2021    Lexiscan- EF 58%. BP- 170/76. Apical Infarct Vs Thinning   • ECHO - CONVERTED  05/19/2016    EF 50-55%, mod AS, AVR well seated and well functioning   • ECHO - CONVERTED  12/08/2016    EF 55-60%. GEENA- 1.5 Cm2. Gr- 34 mmHg. RVSP 23 mmHg   •  ECHO - CONVERTED  05/16/2019    EF 65%. AVR. GEENA- 1.4 Cm2. Gr- 25 mmHg. Mild MR. RVSP- 24 mmHg.   • ECHO - CONVERTED  03/25/2020    EF 60%. AVR. GEENA- 1.2 Cm2. 17/29 mmHg. Mild MR & AI.   • ECHO - CONVERTED  04/28/2021    EF 60%. AVR. GEENA- 1.1 Cm2. 12/23 mmHg. Mild MR. RVSP- 24 mmHg   • ECHO - CONVERTED  02/02/2022    EF 60%. AVR, GEENA- 0.98. 16/30. Mild MR & AI. AO- 4.3    • INSERT / REPLACE / REMOVE PACEMAKER  09/19/2022    [facility]   • OTHER SURGICAL HISTORY  02/17/2022    CTA chest:  stable 4.2cm thoracic aneurysm   • US CAROTID UNILATERAL  11/17/2016    bilateral- no hemodynamically significant stenosis       Current Outpatient Medications   Medication Sig Dispense Refill   • clopidogrel (PLAVIX) 75 MG tablet Take 1 tablet by mouth Daily. 90 tablet 3   • diphenhydrAMINE (BENADRYL) 25 MG tablet Take 50 mg by mouth 2 (Two) Times a Day.     • escitalopram (LEXAPRO) 20 MG tablet Take 20 mg by mouth Daily.     • ezetimibe (ZETIA) 10 MG tablet      • fenofibrate 160 MG tablet Take 1 tablet by mouth Daily. 90 tablet 2   • gabapentin (NEURONTIN) 800 MG tablet Take 800 mg by mouth 3 (Three) Times a Day.     • glyburide (DIAbeta) 5 MG tablet Take 5 mg by mouth Daily With Breakfast.     • insulin glargine (LANTUS, SEMGLEE) 100 UNIT/ML injection Inject  under the skin into the appropriate area as directed Daily.     • insulin regular (humuLIN R,novoLIN R) 100 UNIT/ML injection Inject  under the skin into the appropriate area as directed 3 (Three) Times a Day Before Meals.     • losartan (COZAAR) 50 MG tablet Take 1 tablet by mouth Daily. 90 tablet 2   • magnesium oxide (MAG-OX) 400 MG tablet Take 1 tablet by mouth Daily. 30 tablet 6   • metFORMIN ER (GLUCOPHAGE-XR) 500 MG 24 hr tablet Take 500 mg by mouth.     • methocarbamol (ROBAXIN) 750 MG tablet      • metoprolol tartrate (LOPRESSOR) 50 MG tablet Take 1 tablet by mouth 2 (Two) Times a Day. 180 tablet 2   • nitroglycerin (Nitrolingual) 0.4 MG/SPRAY spray Place 1 spray under  the tongue Every 5 (Five) Minutes As Needed for Chest Pain. 1 each 1   • O2 (OXYGEN) Inhale 3 L/min Daily.     • oxybutynin XL (DITROPAN-XL) 10 MG 24 hr tablet Take 10 mg by mouth Daily.     • pravastatin (PRAVACHOL) 20 MG tablet Take 1 tablet by mouth Daily. 90 tablet 2   • Spiriva Respimat 2.5 MCG/ACT aerosol solution inhaler      • traZODone (DESYREL) 100 MG tablet Take 100 mg by mouth Every Night.     • warfarin (COUMADIN) 5 MG tablet  &  5mg   & Saturday 2.5 mg 90 tablet 2     No current facility-administered medications for this visit.       Capsaicin, Crestor [rosuvastatin calcium], Demerol [meperidine], Ibuprofen, and Lipitor [atorvastatin]    Past Medical History:   Diagnosis Date   • Aneurysm (HCC)      clipping   • Anxiety    • COPD (chronic obstructive pulmonary disease) (HCC)    • CVA (cerebral vascular accident) (HCC)    • Depression    • Diabetes mellitus (HCC)    • GERD (gastroesophageal reflux disease)    • Hx of appendectomy 2017   • Hypercholesterolemia    • Hypertension    • Mechanical heart valve present    • Myocardial infarction (HCC)        Social History     Socioeconomic History   • Marital status:    Tobacco Use   • Smoking status: Former     Types: Cigarettes     Quit date:      Years since quittin.1   • Smokeless tobacco: Never   • Tobacco comments:     Quit 6 years ago   Vaping Use   • Vaping Use: Never used   Substance and Sexual Activity   • Alcohol use: Not Currently   • Drug use: No       Family History   Problem Relation Age of Onset   • COPD Mother        Review of Systems   Constitutional: Negative for malaise/fatigue and night sweats.   Cardiovascular: Positive for dyspnea on exertion. Negative for chest pain, claudication, irregular heartbeat, leg swelling, near-syncope, orthopnea, palpitations and syncope.   Respiratory: Positive for shortness of breath. Negative for cough and wheezing.    Musculoskeletal: Positive for stiffness. Negative for back  "pain and joint pain.   Gastrointestinal: Negative for anorexia, heartburn, nausea and vomiting.   Genitourinary: Negative for dysuria, hematuria, hesitancy and nocturia.   Neurological: Negative for dizziness, headaches and light-headedness.   Psychiatric/Behavioral: Negative for depression and memory loss. The patient is not nervous/anxious.            Objective     /70 (BP Location: Left arm, Patient Position: Sitting)   Pulse 71   Ht 154.9 cm (61\")   Wt 55.4 kg (122 lb 3.2 oz)   BMI 23.09 kg/m²     Constitutional:       Appearance: Frail.   Eyes:      Pupils: Pupils are equal, round, and reactive to light.   HENT:      Nose: Nose normal.   Pulmonary:      Effort: Pulmonary effort is normal.      Breath sounds: Normal breath sounds.   Cardiovascular:      PMI at left midclavicular line. Normal rate. Regular rhythm.      Murmurs: There is a systolic murmur.   Abdominal:      Palpations: Abdomen is soft.   Musculoskeletal: Normal range of motion.      Cervical back: Normal range of motion and neck supple. Skin:     General: Skin is warm and dry.   Neurological:      Mental Status: Alert.           ECG 12 Lead    Date/Time: 2/27/2023 1:59 PM  Performed by: Azeb Givens APRN  Authorized by: Azeb Givens APRN   Comparison: compared with previous ECG from 9/13/2022  Comparison to previous ECG: NSR with frequent PVC and RBBB  Rhythm: paced  BPM: 71    Clinical impression: abnormal EKG  Comments: Normal QT                 Diagnoses and all orders for this visit:    1. Heart murmur (Primary)  -     Adult Transthoracic Echo Complete W/ Cont if Necessary Per Protocol; Future    2. S/P AVR  -     warfarin (COUMADIN) 5 MG tablet; MWF & Sunday 5mg  TT & Saturday 2.5 mg  Dispense: 90 tablet; Refill: 2  -     Adult Transthoracic Echo Complete W/ Cont if Necessary Per Protocol; Future    3. Shortness of breath  -     Adult Transthoracic Echo Complete W/ Cont if Necessary Per Protocol; Future    4. SSS (sick " sinus syndrome) (HCC)  -     ECG 12 Lead    5. Essential hypertension    6. Aneurysm of ascending aorta without rupture    7. Mixed hyperlipidemia    8. Type 2 diabetes mellitus with complication, with long-term current use of insulin (HCC)    9. COPD mixed type (HCC)    10. Encounter for long-term (current) use of non-steroidal anti-inflammatories    Other orders  -     clopidogrel (PLAVIX) 75 MG tablet; Take 1 tablet by mouth Daily.  Dispense: 90 tablet; Refill: 3  -     fenofibrate 160 MG tablet; Take 1 tablet by mouth Daily.  Dispense: 90 tablet; Refill: 2  -     metoprolol tartrate (LOPRESSOR) 50 MG tablet; Take 1 tablet by mouth 2 (Two) Times a Day.  Dispense: 180 tablet; Refill: 2  -     pravastatin (PRAVACHOL) 20 MG tablet; Take 1 tablet by mouth Daily.  Dispense: 90 tablet; Refill: 2  -     losartan (COZAAR) 50 MG tablet; Take 1 tablet by mouth Daily.  Dispense: 90 tablet; Refill: 2             HTN:  Blood pressure stable but mildly elevated, recheck showed .  We will continue with current metoprolol at 50mg BID and losartan at 25mg daily. BP log encouraged for one week, if elevation noted, will call for further recommendation to follow. Limited sodium urged.      SSS: SJM pacer placed on 9/19/2022. EKG done today reveals AV paced rhythm. Repeat pacer check advised.       Redo AVR:  Doing well post surgery. Repeat echo recommended to assess GEENA.     IHD: Recent CABG x 1 with SVG to RCA after in stent stenosis x2. Continues plavix therapy.     Hyperlipidemia:  Remains on statin with lipitor, zetia, and fenofibrate therapy and tolerates well. Side effects denied. Limited carbs, calories, fried/fatty food advised. FLP followed by your office, can we have recent for review?      SOA/COPD:  Continues to use supplemental oxygen therapy. Continues to be smoke free. Followed by pulmonary.      Ascending aneurysm:  Followed by Leah, no surgery done recently on this, will continue to monitor yearly. Repeat  CTA advised for fall.    DM:  Insulin dependent. Followed by your office. She states AIC to be checked next visit. Can we have for our review? Limited carb diet urged.     BMI noted at 23.09, good cardiac ADA diet with limited carbs/calories and activity as tolerated. She was praised for weight loss efforts.     Refills per request.     6 month follow up advised.         Problems Addressed this Visit        Cardiac and Vasculature    S/P AVR    Relevant Medications    warfarin (COUMADIN) 5 MG tablet    Other Relevant Orders    Adult Transthoracic Echo Complete W/ Cont if Necessary Per Protocol    Hyperlipemia    Relevant Medications    ezetimibe (ZETIA) 10 MG tablet    fenofibrate 160 MG tablet    pravastatin (PRAVACHOL) 20 MG tablet    Essential hypertension    Relevant Medications    metoprolol tartrate (LOPRESSOR) 50 MG tablet    losartan (COZAAR) 50 MG tablet       Endocrine and Metabolic    Type 2 diabetes mellitus with complication, with long-term current use of insulin (HCC)    Relevant Medications    metFORMIN ER (GLUCOPHAGE-XR) 500 MG 24 hr tablet       Pulmonary and Pneumonias    COPD mixed type (HCC)    Relevant Medications    Spiriva Respimat 2.5 MCG/ACT aerosol solution inhaler   Other Visit Diagnoses     Heart murmur    -  Primary    Relevant Orders    Adult Transthoracic Echo Complete W/ Cont if Necessary Per Protocol    Shortness of breath        Relevant Orders    Adult Transthoracic Echo Complete W/ Cont if Necessary Per Protocol    SSS (sick sinus syndrome) ([facility])        Relevant Medications    clopidogrel (PLAVIX) 75 MG tablet    metoprolol tartrate (LOPRESSOR) 50 MG tablet    Other Relevant Orders    ECG 12 Lead    Aneurysm of ascending aorta without rupture        Encounter for long-term (current) use of non-steroidal anti-inflammatories          Diagnoses       Codes Comments    Heart murmur    -  Primary ICD-10-CM: R01.1  ICD-9-CM: 785.2     S/P AVR     ICD-10-CM: Z95.2  ICD-9-CM: V43.3      Shortness of breath     ICD-10-CM: R06.02  ICD-9-CM: 786.05     SSS (sick sinus syndrome) (HCC)     ICD-10-CM: I49.5  ICD-9-CM: 427.81     Essential hypertension     ICD-10-CM: I10  ICD-9-CM: 401.9     Aneurysm of ascending aorta without rupture     ICD-10-CM: I71.21  ICD-9-CM: 441.2     Mixed hyperlipidemia     ICD-10-CM: E78.2  ICD-9-CM: 272.2     Type 2 diabetes mellitus with complication, with long-term current use of insulin (HCC)     ICD-10-CM: E11.8, Z79.4  ICD-9-CM: 250.90, V58.67     COPD mixed type (HCC)     ICD-10-CM: J44.9  ICD-9-CM: 496     Encounter for long-term (current) use of non-steroidal anti-inflammatories     ICD-10-CM: Z79.1  ICD-9-CM: V58.64                   Electronically signed by Azeb Givens, APRN February 27, 2023 15:33 EST

## 2023-03-01 ENCOUNTER — ANTICOAGULATION VISIT (OUTPATIENT)
Dept: CARDIOLOGY | Facility: CLINIC | Age: 63
End: 2023-03-01
Payer: MEDICARE

## 2023-03-01 ENCOUNTER — OFFICE VISIT (OUTPATIENT)
Dept: CARDIOLOGY | Facility: CLINIC | Age: 63
End: 2023-03-01
Payer: MEDICARE

## 2023-03-01 DIAGNOSIS — I49.5 SSS (SICK SINUS SYNDROME): Primary | ICD-10-CM

## 2023-03-01 LAB — INR PPP: 2.9

## 2023-03-01 PROCEDURE — 93288 INTERROG EVL PM/LDLS PM IP: CPT | Performed by: INTERNAL MEDICINE

## 2023-03-10 LAB — INR PPP: 2.7

## 2023-03-13 ENCOUNTER — ANTICOAGULATION VISIT (OUTPATIENT)
Dept: CARDIOLOGY | Facility: CLINIC | Age: 63
End: 2023-03-13
Payer: MEDICARE

## 2023-03-13 ENCOUNTER — TELEPHONE (OUTPATIENT)
Dept: CARDIOLOGY | Facility: CLINIC | Age: 63
End: 2023-03-13
Payer: MEDICARE

## 2023-03-22 LAB — INR PPP: 3.9

## 2023-03-23 ENCOUNTER — ANTICOAGULATION VISIT (OUTPATIENT)
Dept: CARDIOLOGY | Facility: CLINIC | Age: 63
End: 2023-03-23
Payer: MEDICARE

## 2023-03-30 LAB
INR PPP: 1.6
INR PPP: 1.6

## 2023-03-31 ENCOUNTER — ANTICOAGULATION VISIT (OUTPATIENT)
Dept: CARDIOLOGY | Facility: CLINIC | Age: 63
End: 2023-03-31
Payer: MEDICARE

## 2023-04-04 LAB — INR PPP: 3.9

## 2023-04-05 RX ORDER — CLOPIDOGREL BISULFATE 75 MG/1
75 TABLET ORAL DAILY
Qty: 90 TABLET | Refills: 2 | Status: SHIPPED | OUTPATIENT
Start: 2023-04-05

## 2023-04-05 RX ORDER — EZETIMIBE 10 MG/1
TABLET ORAL
Qty: 90 TABLET | Refills: 1 | Status: SHIPPED | OUTPATIENT
Start: 2023-04-05

## 2023-04-11 ENCOUNTER — ANTICOAGULATION VISIT (OUTPATIENT)
Dept: CARDIOLOGY | Facility: CLINIC | Age: 63
End: 2023-04-11
Payer: MEDICARE

## 2023-04-13 LAB — INR PPP: 1.4

## 2023-04-14 ENCOUNTER — ANTICOAGULATION VISIT (OUTPATIENT)
Dept: CARDIOLOGY | Facility: CLINIC | Age: 63
End: 2023-04-14
Payer: MEDICARE

## 2023-04-21 ENCOUNTER — TELEPHONE (OUTPATIENT)
Dept: CARDIOLOGY | Facility: CLINIC | Age: 63
End: 2023-04-21
Payer: MEDICARE

## 2023-04-21 NOTE — TELEPHONE ENCOUNTER
Pt called, INR 1.3, pt denies missing a dose or changing/ starting any medication. Aware to take 10 mg today and tomorrow, 5 mg on Sunday, recheck Monday.

## 2023-04-24 ENCOUNTER — ANTICOAGULATION VISIT (OUTPATIENT)
Dept: CARDIOLOGY | Facility: CLINIC | Age: 63
End: 2023-04-24
Payer: MEDICARE

## 2023-04-24 ENCOUNTER — TELEPHONE (OUTPATIENT)
Dept: CARDIOLOGY | Facility: CLINIC | Age: 63
End: 2023-04-24
Payer: MEDICARE

## 2023-04-24 LAB — INR PPP: 7.4

## 2023-04-24 NOTE — TELEPHONE ENCOUNTER
Caller: VIDYA STEWART    Relationship: SELF    Best call back number: [phone]  What is the best time to reach you: ANY    Who are you requesting to speak with (clinical staff, provider,  specific staff member): ANY      What was the call regarding: PATIENT CALLED IN TO GIVE PROTIME BLOODWORK RESULTS OF 7.4    Do you require a callback: YES    PATIENT CALLED IN TO GIVE PROTIME BLOODWORK RESULTS OF 7.4

## 2023-04-24 NOTE — TELEPHONE ENCOUNTER
Pt aware to hold Coumadin today and tomorrow. Recheck Wednesday morning. Pt aware to watch for any s/s of bleeding and go to ER with any noted bleeding.

## 2023-04-26 ENCOUNTER — ANTICOAGULATION VISIT (OUTPATIENT)
Dept: CARDIOLOGY | Facility: CLINIC | Age: 63
End: 2023-04-26
Payer: MEDICARE

## 2023-04-26 ENCOUNTER — TELEPHONE (OUTPATIENT)
Dept: CARDIOLOGY | Facility: CLINIC | Age: 63
End: 2023-04-26
Payer: MEDICARE

## 2023-04-26 LAB — INR PPP: 1.9

## 2023-04-26 NOTE — TELEPHONE ENCOUNTER
Spoke at length with pt regarding being consistent with eating green vegetables weekly. Aware to take Coumadin 2.5 mg daily x 5 mg Wednesday and Friday. Recheck in 1 week.

## 2023-04-26 NOTE — TELEPHONE ENCOUNTER
Caller: Estefany Castañeda    Relationship: Self    Best call back number: [phone]    What is the best time to reach you: ANY    What was the call regarding: PATIENT ADVISING BLOOD 1.9. NEED DOSAGE    Do you require a callback: YES

## 2023-04-30 PROCEDURE — 93296 REM INTERROG EVL PM/IDS: CPT | Performed by: INTERNAL MEDICINE

## 2023-04-30 PROCEDURE — 93294 REM INTERROG EVL PM/LDLS PM: CPT | Performed by: INTERNAL MEDICINE

## 2023-05-03 LAB — INR PPP: 2.9

## 2023-05-04 ENCOUNTER — ANTICOAGULATION VISIT (OUTPATIENT)
Dept: CARDIOLOGY | Facility: CLINIC | Age: 63
End: 2023-05-04
Payer: MEDICARE

## 2023-05-09 LAB — INR PPP: 2.1

## 2023-05-15 ENCOUNTER — ANTICOAGULATION VISIT (OUTPATIENT)
Dept: CARDIOLOGY | Facility: CLINIC | Age: 63
End: 2023-05-15
Payer: MEDICARE

## 2023-05-15 LAB — INR PPP: 3.1

## 2023-05-17 ENCOUNTER — ANTICOAGULATION VISIT (OUTPATIENT)
Dept: CARDIOLOGY | Facility: CLINIC | Age: 63
End: 2023-05-17
Payer: MEDICARE

## 2023-05-23 ENCOUNTER — ANTICOAGULATION VISIT (OUTPATIENT)
Dept: CARDIOLOGY | Facility: CLINIC | Age: 63
End: 2023-05-23
Payer: MEDICARE

## 2023-05-23 LAB — INR PPP: 3.3

## 2023-06-01 LAB — INR PPP: 3.6

## 2023-06-02 ENCOUNTER — ANTICOAGULATION VISIT (OUTPATIENT)
Dept: CARDIOLOGY | Facility: CLINIC | Age: 63
End: 2023-06-02

## 2023-06-07 LAB
INR PPP: 2.5
INR PPP: 3.5

## 2023-06-08 ENCOUNTER — ANTICOAGULATION VISIT (OUTPATIENT)
Dept: CARDIOLOGY | Facility: CLINIC | Age: 63
End: 2023-06-08
Payer: MEDICARE

## 2023-07-25 LAB — INR PPP: 3.7

## 2023-07-27 ENCOUNTER — ANTICOAGULATION VISIT (OUTPATIENT)
Dept: CARDIOLOGY | Facility: CLINIC | Age: 63
End: 2023-07-27
Payer: MEDICARE

## 2023-07-30 PROCEDURE — 93296 REM INTERROG EVL PM/IDS: CPT | Performed by: INTERNAL MEDICINE

## 2023-07-30 PROCEDURE — 93294 REM INTERROG EVL PM/LDLS PM: CPT | Performed by: INTERNAL MEDICINE

## 2023-08-02 NOTE — TELEPHONE ENCOUNTER
Received critical lab information per Seth at [facility] lab , Patients INR 7.03  Trice spoke with patient to hold Coumadin (2 days ) and recheck INR  Thursday  10-  She is advised if any bleeding /problems to go to closest ER.   Rituxan Pregnancy And Lactation Text: This medication is Pregnancy Category C and it isn't know if it is safe during pregnancy. It is unknown if this medication is excreted in breast milk but similar antibodies are known to be excreted.

## 2023-08-07 LAB — INR PPP: 2.5

## 2023-08-10 ENCOUNTER — ANTICOAGULATION VISIT (OUTPATIENT)
Dept: CARDIOLOGY | Facility: CLINIC | Age: 63
End: 2023-08-10
Payer: MEDICARE

## 2023-08-22 ENCOUNTER — ANTICOAGULATION VISIT (OUTPATIENT)
Dept: CARDIOLOGY | Facility: CLINIC | Age: 63
End: 2023-08-22
Payer: MEDICARE

## 2023-08-22 ENCOUNTER — TELEPHONE (OUTPATIENT)
Dept: CARDIOLOGY | Facility: CLINIC | Age: 63
End: 2023-08-22
Payer: MEDICARE

## 2023-08-22 DIAGNOSIS — Z79.01 LONG TERM (CURRENT) USE OF ANTICOAGULANTS: ICD-10-CM

## 2023-08-22 DIAGNOSIS — Z95.2 S/P AVR: ICD-10-CM

## 2023-08-22 LAB — INR PPP: 1.9

## 2023-08-22 NOTE — TELEPHONE ENCOUNTER
Caller: Estefany Castañeda    Relationship: Self    Best call back number: [phone]    What is the best time to reach you: ANY    Who are you requesting to speak with (clinical staff, provider,  specific staff member): CLINICAL      What was the call regarding: PT WAS WISHING TO SPEAK TO YOEL ABOUT HER INR. UNABLE TO GET ANYMORE INFORMATION ABOUT CALL AND INR. UNABLE TO WT TO YOEL'S LINE.

## 2023-09-13 ENCOUNTER — ANTICOAGULATION VISIT (OUTPATIENT)
Dept: CARDIOLOGY | Facility: CLINIC | Age: 63
End: 2023-09-13
Payer: MEDICARE

## 2023-09-13 ENCOUNTER — LAB (OUTPATIENT)
Dept: LAB | Facility: HOSPITAL | Age: 63
End: 2023-09-13
Payer: MEDICARE

## 2023-09-13 DIAGNOSIS — Z79.01 LONG TERM (CURRENT) USE OF ANTICOAGULANTS: ICD-10-CM

## 2023-09-13 DIAGNOSIS — Z95.2 S/P AVR: ICD-10-CM

## 2023-09-13 LAB
INR PPP: 3.54 (ref 0.9–1.1)
PROTHROMBIN TIME: 36.3 SECONDS (ref 12.1–14.7)

## 2023-09-13 PROCEDURE — 85610 PROTHROMBIN TIME: CPT | Performed by: NURSE PRACTITIONER

## 2023-09-20 ENCOUNTER — LAB (OUTPATIENT)
Dept: LAB | Facility: HOSPITAL | Age: 63
End: 2023-09-20
Payer: MEDICARE

## 2023-09-20 LAB
INR PPP: 6.6 (ref 0.9–1.1)
PROTHROMBIN TIME: 58.5 SECONDS (ref 12.1–14.7)

## 2023-09-20 PROCEDURE — 85610 PROTHROMBIN TIME: CPT | Performed by: NURSE PRACTITIONER

## 2023-09-21 ENCOUNTER — ANTICOAGULATION VISIT (OUTPATIENT)
Dept: CARDIOLOGY | Facility: CLINIC | Age: 63
End: 2023-09-21
Payer: MEDICARE

## 2023-09-22 ENCOUNTER — TELEPHONE (OUTPATIENT)
Dept: CARDIOLOGY | Facility: CLINIC | Age: 63
End: 2023-09-22
Payer: MEDICARE

## 2023-09-22 ENCOUNTER — LAB (OUTPATIENT)
Dept: FAMILY MEDICINE CLINIC | Facility: CLINIC | Age: 63
End: 2023-09-22
Payer: MEDICARE

## 2023-09-22 DIAGNOSIS — Z95.2 S/P AVR: ICD-10-CM

## 2023-09-22 DIAGNOSIS — Z79.01 LONG TERM (CURRENT) USE OF ANTICOAGULANTS: Primary | ICD-10-CM

## 2023-09-22 DIAGNOSIS — Z79.01 LONG TERM (CURRENT) USE OF ANTICOAGULANTS: ICD-10-CM

## 2023-09-22 LAB
INR PPP: 3.05 (ref 0.9–1.1)
PROTHROMBIN TIME: 32.3 SECONDS (ref 12.1–14.7)

## 2023-09-22 PROCEDURE — 85610 PROTHROMBIN TIME: CPT | Performed by: NURSE PRACTITIONER

## 2023-09-22 NOTE — TELEPHONE ENCOUNTER
Pt's home monitor for INR is malfunctioning. Advised pt to go to  Primary Care for INR check. Order added for cosign.

## 2023-09-25 ENCOUNTER — ANTICOAGULATION VISIT (OUTPATIENT)
Dept: CARDIOLOGY | Facility: CLINIC | Age: 63
End: 2023-09-25

## 2023-10-09 RX ORDER — EZETIMIBE 10 MG/1
TABLET ORAL
Qty: 90 TABLET | Refills: 1 | Status: SHIPPED | OUTPATIENT
Start: 2023-10-09

## 2023-10-16 LAB — INR PPP: 2.3

## 2023-10-17 ENCOUNTER — ANTICOAGULATION VISIT (OUTPATIENT)
Dept: CARDIOLOGY | Facility: CLINIC | Age: 63
End: 2023-10-17
Payer: MEDICARE

## 2023-10-24 ENCOUNTER — TELEPHONE (OUTPATIENT)
Dept: CARDIOLOGY | Facility: CLINIC | Age: 63
End: 2023-10-24
Payer: MEDICARE

## 2023-10-25 NOTE — TELEPHONE ENCOUNTER
Spoke with patient yesterday, aware to take 10 mg last night and 10 mg today. Recheck Thursday morning.

## 2023-11-07 ENCOUNTER — ANTICOAGULATION VISIT (OUTPATIENT)
Dept: CARDIOLOGY | Facility: CLINIC | Age: 63
End: 2023-11-07
Payer: MEDICARE

## 2023-11-07 LAB — INR PPP: 7.7

## 2023-11-21 LAB — INR PPP: 4.1

## 2023-11-22 ENCOUNTER — ANTICOAGULATION VISIT (OUTPATIENT)
Dept: CARDIOLOGY | Facility: CLINIC | Age: 63
End: 2023-11-22
Payer: MEDICARE

## 2023-11-30 ENCOUNTER — ANTICOAGULATION VISIT (OUTPATIENT)
Dept: CARDIOLOGY | Facility: CLINIC | Age: 63
End: 2023-11-30
Payer: MEDICARE

## 2023-11-30 ENCOUNTER — TELEPHONE (OUTPATIENT)
Dept: CARDIOLOGY | Facility: CLINIC | Age: 63
End: 2023-11-30
Payer: MEDICARE

## 2023-11-30 DIAGNOSIS — Z95.2 S/P AVR: ICD-10-CM

## 2023-11-30 DIAGNOSIS — Z79.01 LONG TERM (CURRENT) USE OF ANTICOAGULANTS: ICD-10-CM

## 2023-11-30 LAB — INR PPP: 7.1

## 2023-11-30 NOTE — TELEPHONE ENCOUNTER
Pt called me with results on 11/29/23. Pt advised to hold 2 days and recheck Friday morning. Aware to go to ER with any s/s of bleeding.

## 2023-12-06 ENCOUNTER — LAB (OUTPATIENT)
Dept: LAB | Facility: HOSPITAL | Age: 63
End: 2023-12-06
Payer: MEDICARE

## 2023-12-06 DIAGNOSIS — Z95.2 S/P AVR: ICD-10-CM

## 2023-12-06 DIAGNOSIS — Z79.01 LONG TERM (CURRENT) USE OF ANTICOAGULANTS: ICD-10-CM

## 2023-12-06 LAB
INR PPP: 1.87 (ref 0.9–1.1)
PROTHROMBIN TIME: 22.2 SECONDS (ref 12.1–14.7)

## 2023-12-06 PROCEDURE — 85610 PROTHROMBIN TIME: CPT | Performed by: NURSE PRACTITIONER

## 2023-12-07 ENCOUNTER — ANTICOAGULATION VISIT (OUTPATIENT)
Dept: CARDIOLOGY | Facility: CLINIC | Age: 63
End: 2023-12-07
Payer: MEDICARE

## 2023-12-18 DIAGNOSIS — Z95.2 S/P AVR: ICD-10-CM

## 2023-12-18 DIAGNOSIS — Z79.01 LONG TERM (CURRENT) USE OF ANTICOAGULANTS: ICD-10-CM

## 2024-01-02 DIAGNOSIS — Z79.01 LONG TERM (CURRENT) USE OF ANTICOAGULANTS: ICD-10-CM

## 2024-01-02 DIAGNOSIS — Z95.2 S/P AVR: ICD-10-CM

## 2024-01-15 ENCOUNTER — TELEPHONE (OUTPATIENT)
Dept: CARDIOLOGY | Facility: CLINIC | Age: 64
End: 2024-01-15
Payer: MEDICARE

## 2024-01-15 NOTE — TELEPHONE ENCOUNTER
Caller: Estefany Castañeda    Relationship: Self    Best call back number: [phone]    What is the best time to reach you: ANY     Who are you requesting to speak with (clinical staff, provider,  specific staff member): MALAIKA     What was the call regarding: PT STATES SHE HAD MISSED A NOTIFICATION FROM MALAIKA, AND WAS JUST CALLING TO SEE WHAT SHE HAD NEEDED TO DISCUSS. PLEASE REACH OUT TO FURTHER ADVISE. THANK YOU!      Is it okay if the provider responds through [employer]: PLEASE CALL

## 2024-01-29 ENCOUNTER — TELEPHONE (OUTPATIENT)
Dept: CARDIOLOGY | Facility: CLINIC | Age: 64
End: 2024-01-29
Payer: MEDICARE

## 2024-01-29 ENCOUNTER — LAB (OUTPATIENT)
Dept: LAB | Facility: HOSPITAL | Age: 64
End: 2024-01-29
Payer: MEDICARE

## 2024-01-29 DIAGNOSIS — Z95.2 S/P AVR: ICD-10-CM

## 2024-01-29 DIAGNOSIS — Z79.01 LONG TERM (CURRENT) USE OF ANTICOAGULANTS: ICD-10-CM

## 2024-01-29 DIAGNOSIS — Z79.01 LONG TERM (CURRENT) USE OF ANTICOAGULANTS: Primary | ICD-10-CM

## 2024-01-29 LAB
INR PPP: 4.59 (ref 0.9–1.1)
PROTHROMBIN TIME: 44.2 SECONDS (ref 12.1–14.7)

## 2024-01-29 PROCEDURE — 85610 PROTHROMBIN TIME: CPT | Performed by: NURSE PRACTITIONER

## 2024-01-29 NOTE — TELEPHONE ENCOUNTER
1/26/24 pt was aware to take 3 tabs Friday, 2 tabs Sat and Sun, recheck with home monitor on Monday and go to Lab for INR also to check for accuracy.

## 2024-01-30 ENCOUNTER — ANTICOAGULATION VISIT (OUTPATIENT)
Dept: CARDIOLOGY | Facility: CLINIC | Age: 64
End: 2024-01-30
Payer: MEDICARE

## 2024-01-30 DIAGNOSIS — Z95.2 S/P AVR: ICD-10-CM

## 2024-01-30 RX ORDER — WARFARIN SODIUM 5 MG/1
TABLET ORAL
Qty: 90 TABLET | Refills: 1 | Status: SHIPPED | OUTPATIENT
Start: 2024-01-30

## 2024-01-31 RX ORDER — LOSARTAN POTASSIUM 50 MG/1
50 TABLET ORAL DAILY
Qty: 90 TABLET | Refills: 1 | Status: SHIPPED | OUTPATIENT
Start: 2024-01-31

## 2024-02-01 ENCOUNTER — ANTICOAGULATION VISIT (OUTPATIENT)
Dept: CARDIOLOGY | Facility: CLINIC | Age: 64
End: 2024-02-01
Payer: MEDICARE

## 2024-02-01 LAB — INR PPP: 4.5

## 2024-02-06 LAB — INR PPP: 3.2

## 2024-02-08 DIAGNOSIS — Z95.2 S/P AVR: ICD-10-CM

## 2024-02-08 DIAGNOSIS — Z79.01 LONG TERM (CURRENT) USE OF ANTICOAGULANTS: ICD-10-CM

## 2024-02-09 ENCOUNTER — ANTICOAGULATION VISIT (OUTPATIENT)
Dept: CARDIOLOGY | Facility: CLINIC | Age: 64
End: 2024-02-09
Payer: MEDICARE

## 2024-02-13 LAB — INR PPP: 2.1

## 2024-02-15 ENCOUNTER — ANTICOAGULATION VISIT (OUTPATIENT)
Dept: CARDIOLOGY | Facility: CLINIC | Age: 64
End: 2024-02-15
Payer: MEDICARE

## 2024-02-19 LAB — INR PPP: 3.3

## 2024-02-20 ENCOUNTER — ANTICOAGULATION VISIT (OUTPATIENT)
Dept: CARDIOLOGY | Facility: CLINIC | Age: 64
End: 2024-02-20
Payer: MEDICARE

## 2024-02-20 DIAGNOSIS — Z79.01 LONG TERM (CURRENT) USE OF ANTICOAGULANTS: ICD-10-CM

## 2024-02-20 DIAGNOSIS — Z95.2 S/P AVR: ICD-10-CM

## 2024-02-28 LAB — INR PPP: 2.2

## 2024-03-04 LAB — INR PPP: 4.8

## 2024-03-05 ENCOUNTER — ANTICOAGULATION VISIT (OUTPATIENT)
Dept: CARDIOLOGY | Facility: CLINIC | Age: 64
End: 2024-03-05
Payer: MEDICARE

## 2024-03-05 DIAGNOSIS — Z79.01 LONG TERM (CURRENT) USE OF ANTICOAGULANTS: ICD-10-CM

## 2024-03-05 DIAGNOSIS — Z95.2 S/P AVR: ICD-10-CM

## 2024-03-07 ENCOUNTER — ANTICOAGULATION VISIT (OUTPATIENT)
Dept: CARDIOLOGY | Facility: CLINIC | Age: 64
End: 2024-03-07
Payer: MEDICARE

## 2024-03-15 LAB — INR PPP: 3.8

## 2024-03-21 ENCOUNTER — ANTICOAGULATION VISIT (OUTPATIENT)
Dept: CARDIOLOGY | Facility: CLINIC | Age: 64
End: 2024-03-21
Payer: MEDICARE

## 2024-03-21 DIAGNOSIS — Z95.2 S/P AVR: ICD-10-CM

## 2024-03-21 DIAGNOSIS — Z79.01 LONG TERM (CURRENT) USE OF ANTICOAGULANTS: ICD-10-CM

## 2024-03-21 LAB — INR PPP: 2

## 2024-03-26 ENCOUNTER — ANTICOAGULATION VISIT (OUTPATIENT)
Dept: CARDIOLOGY | Facility: CLINIC | Age: 64
End: 2024-03-26
Payer: MEDICARE

## 2024-03-29 LAB — INR PPP: 2.8

## 2024-03-29 RX ORDER — EZETIMIBE 10 MG/1
TABLET ORAL
Qty: 90 TABLET | Refills: 0 | Status: SHIPPED | OUTPATIENT
Start: 2024-03-29

## 2024-04-01 DIAGNOSIS — Z79.01 LONG TERM (CURRENT) USE OF ANTICOAGULANTS: ICD-10-CM

## 2024-04-01 DIAGNOSIS — Z95.2 S/P AVR: ICD-10-CM

## 2024-04-08 ENCOUNTER — TELEPHONE (OUTPATIENT)
Dept: CARDIOLOGY | Facility: CLINIC | Age: 64
End: 2024-04-08
Payer: MEDICARE

## 2024-04-08 ENCOUNTER — ANTICOAGULATION VISIT (OUTPATIENT)
Dept: CARDIOLOGY | Facility: CLINIC | Age: 64
End: 2024-04-08
Payer: MEDICARE

## 2024-04-08 DIAGNOSIS — Z79.01 LONG TERM (CURRENT) USE OF ANTICOAGULANTS: ICD-10-CM

## 2024-04-08 DIAGNOSIS — Z95.2 S/P AVR: ICD-10-CM

## 2024-04-08 NOTE — TELEPHONE ENCOUNTER
Spoke with patient. INR 1.4, pt denies being on antibiotics or having missed a dose. Taking 1/2 tab daily. Aware to take 1 1/2 tabs today and tomorrow, recheck Wednesday.

## 2024-04-08 NOTE — TELEPHONE ENCOUNTER
Caller: Esetfany Castañeda    Relationship: Self    Best call back number: [phone]    What is the best time to reach you: ANYTIME    Who are you requesting to speak with (clinical staff, provider,  specific staff member): CLINICAL    Do you know the name of the person who called: N/A    What was the call regarding: PATIENT IS CALLING ASKING TO SPEAK TO ANDREA ABOUT BLOOD TEST RESULTS. PLEASE REACH OUT TO PATIENT.    Is it okay if the provider responds through MyChart: PREFER A CALL

## 2024-04-17 LAB — INR PPP: 6

## 2024-04-18 ENCOUNTER — TELEPHONE (OUTPATIENT)
Dept: CARDIOLOGY | Facility: CLINIC | Age: 64
End: 2024-04-18
Payer: MEDICARE

## 2024-04-18 ENCOUNTER — ANTICOAGULATION VISIT (OUTPATIENT)
Dept: CARDIOLOGY | Facility: CLINIC | Age: 64
End: 2024-04-18
Payer: MEDICARE

## 2024-04-18 NOTE — TELEPHONE ENCOUNTER
Caller: VIDYA STEWART    Phone: [phone]     INR Number Being Reported: 6.0      Day of the Week  Monday Tuesday Wednesday Thursday Friday Saturday Sunday     Dose Taken 0.5 0.5 0.5 0.5 0.5 0.5 0.5

## 2024-05-05 LAB — INR PPP: 1.8

## 2024-05-07 DIAGNOSIS — Z79.01 LONG TERM (CURRENT) USE OF ANTICOAGULANTS: ICD-10-CM

## 2024-05-07 DIAGNOSIS — Z95.2 S/P AVR: ICD-10-CM

## 2024-05-14 ENCOUNTER — ANTICOAGULATION VISIT (OUTPATIENT)
Dept: CARDIOLOGY | Facility: CLINIC | Age: 64
End: 2024-05-14
Payer: MEDICARE

## 2024-05-14 DIAGNOSIS — Z79.01 LONG TERM (CURRENT) USE OF ANTICOAGULANTS: ICD-10-CM

## 2024-05-14 DIAGNOSIS — Z95.2 S/P AVR: ICD-10-CM

## 2024-05-14 LAB — INR PPP: 6.6

## 2024-05-20 ENCOUNTER — LAB (OUTPATIENT)
Dept: LAB | Facility: HOSPITAL | Age: 64
End: 2024-05-20
Payer: MEDICARE

## 2024-05-20 LAB
INR PPP: 1.46 (ref 0.9–1.1)
PROTHROMBIN TIME: 17.9 SECONDS (ref 12.1–14.7)

## 2024-05-20 PROCEDURE — 85610 PROTHROMBIN TIME: CPT | Performed by: NURSE PRACTITIONER

## 2024-05-23 LAB — INR PPP: 3.2

## 2024-05-24 ENCOUNTER — ANTICOAGULATION VISIT (OUTPATIENT)
Dept: CARDIOLOGY | Facility: CLINIC | Age: 64
End: 2024-05-24
Payer: MEDICARE

## 2024-06-02 LAB — INR PPP: 3.4

## 2024-06-03 ENCOUNTER — ANTICOAGULATION VISIT (OUTPATIENT)
Dept: CARDIOLOGY | Facility: CLINIC | Age: 64
End: 2024-06-03
Payer: MEDICARE

## 2024-06-03 DIAGNOSIS — Z79.01 LONG TERM (CURRENT) USE OF ANTICOAGULANTS: ICD-10-CM

## 2024-06-03 DIAGNOSIS — Z95.2 S/P AVR: ICD-10-CM

## 2024-06-13 ENCOUNTER — LAB (OUTPATIENT)
Dept: LAB | Facility: HOSPITAL | Age: 64
End: 2024-06-13
Payer: MEDICARE

## 2024-06-13 DIAGNOSIS — Z95.2 S/P AVR: ICD-10-CM

## 2024-06-13 DIAGNOSIS — Z79.01 LONG TERM (CURRENT) USE OF ANTICOAGULANTS: ICD-10-CM

## 2024-06-13 LAB
INR PPP: 3.22 (ref 0.9–1.1)
PROTHROMBIN TIME: 33 SECONDS (ref 12.1–14.7)

## 2024-06-13 PROCEDURE — 85610 PROTHROMBIN TIME: CPT

## 2024-06-14 ENCOUNTER — ANTICOAGULATION VISIT (OUTPATIENT)
Dept: CARDIOLOGY | Facility: CLINIC | Age: 64
End: 2024-06-14
Payer: MEDICARE

## 2024-06-24 LAB — INR PPP: 4.7

## 2024-06-25 ENCOUNTER — ANTICOAGULATION VISIT (OUTPATIENT)
Dept: CARDIOLOGY | Facility: CLINIC | Age: 64
End: 2024-06-25
Payer: MEDICARE

## 2024-06-25 DIAGNOSIS — Z95.2 S/P AVR: ICD-10-CM

## 2024-06-25 DIAGNOSIS — Z79.01 LONG TERM (CURRENT) USE OF ANTICOAGULANTS: ICD-10-CM

## 2024-07-02 ENCOUNTER — ANTICOAGULATION VISIT (OUTPATIENT)
Dept: CARDIOLOGY | Facility: CLINIC | Age: 64
End: 2024-07-02
Payer: MEDICARE

## 2024-07-02 DIAGNOSIS — Z79.01 LONG TERM (CURRENT) USE OF ANTICOAGULANTS: ICD-10-CM

## 2024-07-02 DIAGNOSIS — Z95.2 S/P AVR: ICD-10-CM

## 2024-07-02 LAB — INR PPP: 5.2

## 2024-07-08 LAB — INR PPP: 3.2

## 2024-07-15 LAB — INR PPP: 3.3

## 2024-07-24 ENCOUNTER — ANTICOAGULATION VISIT (OUTPATIENT)
Dept: CARDIOLOGY | Facility: CLINIC | Age: 64
End: 2024-07-24
Payer: MEDICARE

## 2024-07-24 ENCOUNTER — TELEPHONE (OUTPATIENT)
Dept: CARDIOLOGY | Facility: CLINIC | Age: 64
End: 2024-07-24
Payer: MEDICARE

## 2024-07-24 LAB — INR PPP: 1.7

## 2024-07-24 RX ORDER — EZETIMIBE 10 MG/1
10 TABLET ORAL DAILY
Qty: 90 TABLET | Refills: 0 | OUTPATIENT
Start: 2024-07-24

## 2024-07-24 NOTE — TELEPHONE ENCOUNTER
"Attempted to contact patient but no answer, left voicemail.    Hub to relay,     \"Unfortunately since it has been over a year, we will be unable to refill your medications until you are seen in office. Your family doctor may refill your medications in the meantime until your next scheduled appointment. You can call for cancellations for an earlier appointment. Trice would also like to discuss options of having your PT/INR checked as well.\"     Please transfer call to office to Trice.   "

## 2024-07-29 DIAGNOSIS — Z95.2 S/P AVR: ICD-10-CM

## 2024-07-29 LAB — INR PPP: 3.6

## 2024-07-29 NOTE — TELEPHONE ENCOUNTER
Pt had a refill request on Coumadin come in. LM for pt. She was to recheck INR on Friday and call me with results so I could call her PCP to advise since she has not been seen her in over a year. I didn't get a call from her or any results from her monitoring company. On her message I asked her call me to discuss all the above.

## 2024-07-30 DIAGNOSIS — Z95.2 S/P AVR: ICD-10-CM

## 2024-07-30 DIAGNOSIS — Z79.01 LONG TERM (CURRENT) USE OF ANTICOAGULANTS: ICD-10-CM

## 2024-08-01 ENCOUNTER — ANTICOAGULATION VISIT (OUTPATIENT)
Dept: CARDIOLOGY | Facility: CLINIC | Age: 64
End: 2024-08-01
Payer: MEDICARE

## 2024-08-01 NOTE — TELEPHONE ENCOUNTER
I spoke with patient at length discussing how important being compliant with follow up visits and that we will not be able to manage INR or write scripts if she doesn't keep this next appointment. Understanding voiced. I have offered her a sooner appointment on 8/12 at 9 45.  She said that would work. Aware that if she does not come to this appointment scripts and INR management will be turned over to her PCP. Understanding voiced. Confirmed she does have enough medication to last until she is seen.

## 2024-08-12 ENCOUNTER — OFFICE VISIT (OUTPATIENT)
Dept: CARDIOLOGY | Facility: CLINIC | Age: 64
End: 2024-08-12
Payer: MEDICARE

## 2024-08-12 VITALS
HEART RATE: 98 BPM | HEIGHT: 61 IN | DIASTOLIC BLOOD PRESSURE: 80 MMHG | SYSTOLIC BLOOD PRESSURE: 122 MMHG | WEIGHT: 141.6 LBS | BODY MASS INDEX: 26.73 KG/M2

## 2024-08-12 DIAGNOSIS — R07.89 CHEST PRESSURE: ICD-10-CM

## 2024-08-12 DIAGNOSIS — I25.9 IHD (ISCHEMIC HEART DISEASE): Primary | ICD-10-CM

## 2024-08-12 DIAGNOSIS — E11.8 TYPE 2 DIABETES MELLITUS WITH COMPLICATION, WITH LONG-TERM CURRENT USE OF INSULIN: ICD-10-CM

## 2024-08-12 DIAGNOSIS — R06.02 SHORTNESS OF BREATH: ICD-10-CM

## 2024-08-12 DIAGNOSIS — I10 ESSENTIAL HYPERTENSION: ICD-10-CM

## 2024-08-12 DIAGNOSIS — E78.00 HYPERCHOLESTEROLEMIA: ICD-10-CM

## 2024-08-12 DIAGNOSIS — I49.5 SSS (SICK SINUS SYNDROME): ICD-10-CM

## 2024-08-12 DIAGNOSIS — Z79.4 TYPE 2 DIABETES MELLITUS WITH COMPLICATION, WITH LONG-TERM CURRENT USE OF INSULIN: ICD-10-CM

## 2024-08-12 DIAGNOSIS — R42 DIZZINESS: ICD-10-CM

## 2024-08-12 PROCEDURE — 99214 OFFICE O/P EST MOD 30 MIN: CPT | Performed by: INTERNAL MEDICINE

## 2024-08-12 PROCEDURE — 3074F SYST BP LT 130 MM HG: CPT | Performed by: INTERNAL MEDICINE

## 2024-08-12 PROCEDURE — 1160F RVW MEDS BY RX/DR IN RCRD: CPT | Performed by: INTERNAL MEDICINE

## 2024-08-12 PROCEDURE — 3079F DIAST BP 80-89 MM HG: CPT | Performed by: INTERNAL MEDICINE

## 2024-08-12 PROCEDURE — 1159F MED LIST DOCD IN RCRD: CPT | Performed by: INTERNAL MEDICINE

## 2024-08-12 PROCEDURE — 93000 ELECTROCARDIOGRAM COMPLETE: CPT | Performed by: INTERNAL MEDICINE

## 2024-08-12 RX ORDER — ASPIRIN 81 MG/1
81 TABLET ORAL DAILY
Qty: 90 TABLET | Refills: 3 | Status: SHIPPED | OUTPATIENT
Start: 2024-08-12

## 2024-08-12 RX ORDER — ISOSORBIDE MONONITRATE 30 MG/1
30 TABLET, EXTENDED RELEASE ORAL DAILY
Qty: 90 TABLET | Refills: 3 | Status: SHIPPED | OUTPATIENT
Start: 2024-08-12

## 2024-08-12 RX ORDER — HYDROCODONE BITARTRATE AND ACETAMINOPHEN 5; 325 MG/1; MG/1
1 TABLET ORAL 3 TIMES DAILY
COMMUNITY

## 2024-08-12 RX ORDER — ALBUTEROL SULFATE 90 UG/1
2 AEROSOL, METERED RESPIRATORY (INHALATION) EVERY 4 HOURS PRN
COMMUNITY

## 2024-08-12 NOTE — LETTER
August 12, 2024       No Recipients    Patient: Estefany Castañeda   YOB: 1960   Date of Visit: 8/12/2024     Dear Loy Wallis MD:       Thank you for referring Estefany Castañeda to me for evaluation. Below are the relevant portions of my assessment and plan of care.    If you have questions, please do not hesitate to call me. I look forward to following Estefany along with you.         Sincerely,        Joelle Jay MD        CC:   No Recipients    Joelle Jay MD  08/12/24 1314  Sign when Signing Visit  Chief Complaint   Patient presents with   • Follow-up     Cardiac management   • Pacemaker Check     Last [employer] interrogation 3/1/23 and last remote check 7/18/24.   • Lab     Last labs 8/8/24 per PCP. Reports having echo at [facility] in April, attempting to obtain.   • Shortness of Breath     Increased shortness of breath for the last 1 1/2 weeks. PCP did start Cefdinir, she reports no relief. Wears O2 at 3 L/M C. She has also been using inhalers with no relief.    • Chest Pain     Having sharp pain in mid chest and feels like someone sitting on chest, lasting 20 minutes. Has noticed the intermittent pain for the last week.   • Dizziness     Has noticed for the last week. Relieved with rest.    • Med Refill     PCP writes refills on medications.       CARDIAC COMPLAINTS  chest pressure/discomfort, dyspnea, Dizziness, and fatigue        Subjective  Estefany Castañeda is a 63 y.o. female came in today for her follow-up visit.  She was last seen here in February 2023.  She has history of aortic valve disease for which she underwent tissue valve replacement in 95.  Since then she has undergone multiple echocardiogram and it showed gradually the valve is getting more stenosed.  She then underwent stenting of the right coronary artery in February 2022 and in September also.  She also developed syncopal episode and AV block for which she had a [employer] pacemaker placed in September.  Because of  the severe stenosis she was referred to  and had a mechanical valve placed along with SVG to PDA.  She apparently got admitted to the hospital in April with sudden onset of increasing shortness of breath hypertensive emergency.  Her echocardiogram showed functioning aortic valve.  Changes with medication was done and was sent home.  She came today stating that she still continues to have shortness of breath.  She is now using oxygen 3 L constantly.  She started noticing chest pain in the form of someone sitting on her chest lasting for 20 minutes.  Her last [employer] remote monitoring showed she is pacing in the ventricle almost 99%.              Cardiac History  Past Surgical History:   Procedure Laterality Date   • AORTIC VALVE REPAIR/REPLACEMENT  09/1995    [facility] [facility]    • AORTIC VALVE SURGERY  11/07/2022    AVR # 23 [employer] mechanical, SVG to PDA   • CARDIAC CATHETERIZATION  07/18/2009    () Normal Coronaries. Acute takeoff of the LM.    • CARDIAC CATHETERIZATION  02/18/2022    95% RCA- 3.5x22 Resolute   • CARDIAC CATHETERIZATION  09/16/2022    100% RCA Stent- 4.0x22 & 2.5x8 Resolute. AO Root dilated   • CARDIOVASCULAR STRESS TEST  05/19/2016    EF 65%, no ischemia   • CARDIOVASCULAR STRESS TEST  05/16/2019    L.Cardiolite- EF 65%. Negative.   • CARDIOVASCULAR STRESS TEST  04/28/2021    Lexiscan- EF 58%. BP- 170/76. Apical Infarct Vs Thinning   • ECHO - CONVERTED  05/19/2016    EF 50-55%, mod AS, AVR well seated and well functioning   • ECHO - CONVERTED  12/08/2016    EF 55-60%. GEENA- 1.5 Cm2. Gr- 34 mmHg. RVSP 23 mmHg   • ECHO - CONVERTED  05/16/2019    EF 65%. AVR. GEENA- 1.4 Cm2. Gr- 25 mmHg. Mild MR. RVSP- 24 mmHg.   • ECHO - CONVERTED  03/25/2020    EF 60%. AVR. GEENA- 1.2 Cm2. 17/29 mmHg. Mild MR & AI.   • ECHO - CONVERTED  04/28/2021    EF 60%. AVR. GEENA- 1.1 Cm2. 12/23 mmHg. Mild MR. RVSP- 24 mmHg   • ECHO - CONVERTED  02/02/2022    EF 60%. AVR, GEENA- 0.98. 16/30. Mild MR & AI. AO- 4.3    • ECHO -  CONVERTED  04/26/2024    @ [facility].- EF 50%. Mild MR. AVR. RVSP- 35 mmHg   • OTHER SURGICAL HISTORY  02/17/2022    CTA chest:  stable 4.2cm thoracic aneurysm   • OTHER SURGICAL HISTORY  04/26/2024    RA US- > 60% (L) Renal artery   • PACEMAKER IMPLANTATION  09/19/2022    [facility] [facility] by Dr. Villalobos   • US CAROTID UNILATERAL  11/17/2016    bilateral- no hemodynamically significant stenosis       Current Outpatient Medications   Medication Sig Dispense Refill   • albuterol sulfate  (90 Base) MCG/ACT inhaler Inhale 2 puffs Every 4 (Four) Hours As Needed for Wheezing.     • Cariprazine HCl (Vraylar) 1.5 MG capsule capsule Take 1 capsule by mouth Daily.     • diphenhydrAMINE (BENADRYL) 25 MG tablet Take 2 tablets by mouth 2 (Two) Times a Day.     • escitalopram (LEXAPRO) 20 MG tablet Take 1 tablet by mouth Daily.     • ezetimibe (ZETIA) 10 MG tablet TAKE 1 TABLET BY MOUTH DAILY 90 tablet 0   • fenofibrate 160 MG tablet Take 1 tablet by mouth Daily. 90 tablet 2   • glyburide (DIAbeta) 5 MG tablet Take 1 tablet by mouth 2 (Two) Times a Day With Meals.     • HYDROcodone-acetaminophen (NORCO) 5-325 MG per tablet Take 1 tablet by mouth 3 times a day.     • insulin glargine (LANTUS, SEMGLEE) 100 UNIT/ML injection Inject  under the skin into the appropriate area as directed Daily.     • insulin regular (humuLIN R,novoLIN R) 100 UNIT/ML injection Inject  under the skin into the appropriate area as directed 3 (Three) Times a Day Before Meals.     • losartan (COZAAR) 50 MG tablet TAKE 1 TABLET BY MOUTH EVERY DAY 90 tablet 1   • metFORMIN ER (GLUCOPHAGE-XR) 500 MG 24 hr tablet Take 1 tablet by mouth Daily With Breakfast.     • metoprolol tartrate (LOPRESSOR) 50 MG tablet Take 1 tablet by mouth 2 (Two) Times a Day. 180 tablet 2   • nitroglycerin (Nitrolingual) 0.4 MG/SPRAY spray Place 1 spray under the tongue Every 5 (Five) Minutes As Needed for Chest Pain. 1 each 1   • O2 (OXYGEN) Inhale 3 L/min Daily.     • oxybutynin  XL (DITROPAN-XL) 10 MG 24 hr tablet Take 1 tablet by mouth Daily.     • pravastatin (PRAVACHOL) 20 MG tablet Take 1 tablet by mouth Daily. 90 tablet 2   • Spiriva Respimat 2.5 MCG/ACT aerosol solution inhaler Daily As Needed.     • traZODone (DESYREL) 100 MG tablet Take 1 tablet by mouth Every Night.     • warfarin (COUMADIN) 5 MG tablet TAKE 1 TABLET BY MOUTH ON MONDAY, WEDNESDAY, FRIDAY, AND SUNDAY, TAKE 1/2 TABLET ON TUESDAY, THURSAY, AND SATURDAY 90 tablet 1   • aspirin 81 MG EC tablet Take 1 tablet by mouth Daily. 90 tablet 3   • isosorbide mononitrate (IMDUR) 30 MG 24 hr tablet Take 1 tablet by mouth Daily. 90 tablet 3     No current facility-administered medications for this visit.       Allergies  :  Capsaicin, Crestor [rosuvastatin calcium], Demerol [meperidine], Ibuprofen, and Lipitor [atorvastatin]       Past Medical History:   Diagnosis Date   • Aneurysm      clipping   • Anxiety    • COPD (chronic obstructive pulmonary disease)    • CVA (cerebral vascular accident)    • Depression    • Diabetes mellitus    • GERD (gastroesophageal reflux disease)    • Hx of appendectomy 12/2017   • Hypercholesterolemia    • Hypertension    • Mechanical heart valve present    • Myocardial infarction        Social History     Socioeconomic History   • Marital status:    Tobacco Use   • Smoking status: Former     Current packs/day: 0.00     Types: Cigarettes     Quit date: 2009     Years since quitting: 15.6     Passive exposure: Current   • Smokeless tobacco: Never   • Tobacco comments:     Quit 6 years ago   Vaping Use   • Vaping status: Never Used   Substance and Sexual Activity   • Alcohol use: Not Currently   • Drug use: No   • Sexual activity: Defer       Family History   Problem Relation Age of Onset   • COPD Mother        Review of Systems   Constitutional: Positive for malaise/fatigue. Negative for decreased appetite.   HENT:  Negative for congestion and sore throat.    Eyes:  Negative for blurred vision,  "double vision and visual disturbance.   Cardiovascular:  Positive for chest pain and dyspnea on exertion.   Respiratory:  Positive for shortness of breath. Negative for snoring.    Endocrine: Negative for cold intolerance and heat intolerance.   Hematologic/Lymphatic: Negative for adenopathy. Does not bruise/bleed easily.   Skin:  Negative for itching, nail changes and skin cancer.   Musculoskeletal:  Negative for arthritis and myalgias.   Gastrointestinal:  Negative for abdominal pain, dysphagia and heartburn.   Genitourinary:  Negative for bladder incontinence and frequency.   Neurological:  Negative for dizziness, seizures and vertigo.   Psychiatric/Behavioral:  Negative for altered mental status.    Allergic/Immunologic: Negative for environmental allergies and hives.       Diabetes- Yes  Thyroid- normal    Objective    /80 (BP Location: Left arm, Patient Position: Sitting)   Pulse 98   Ht 154.9 cm (60.98\")   Wt 64.2 kg (141 lb 9.6 oz)   BMI 26.77 kg/m²     Vitals and nursing note reviewed.   Constitutional:       Appearance: Healthy appearance. Not in distress.   Eyes:      Conjunctiva/sclera: Conjunctivae normal.      Pupils: Pupils are equal, round, and reactive to light.   HENT:      Head: Normocephalic.   Pulmonary:      Effort: Pulmonary effort is normal.      Breath sounds: Normal breath sounds.   Cardiovascular:      PMI at left midclavicular line. Normal rate. Regular rhythm. mechanical S2.       Murmurs: There is a grade 3/6 high frequency blowing holosystolic murmur at the apex.   Abdominal:      General: Bowel sounds are normal.      Palpations: Abdomen is soft.   Musculoskeletal: Normal range of motion.      Cervical back: Normal range of motion and neck supple. Skin:     General: Skin is warm and dry.   Neurological:      Mental Status: Alert, oriented to person, place, and time and oriented to person, place and time.         ECG 12 Lead    Date/Time: 8/12/2024 1:12 PM  Performed by: " Joelle Jay MD    Authorized by: Joelle Jay MD  Comparison: compared with previous ECG from 2/27/2023  Comparison to previous ECG: Rate is better  Rhythm: paced  Rate: normal  QRS axis: normal    Clinical impression: abnormal EKG                  @ASSESSMENT/PLAN@  BMI is >= 25 and <30. (Overweight) The following options were offered after discussion;: exercise counseling/recommendations and nutrition counseling/recommendations     Diagnoses and all orders for this visit:    1. IHD (ischemic heart disease) (Primary)  -     aspirin 81 MG EC tablet; Take 1 tablet by mouth Daily.  Dispense: 90 tablet; Refill: 3  -     isosorbide mononitrate (IMDUR) 30 MG 24 hr tablet; Take 1 tablet by mouth Daily.  Dispense: 90 tablet; Refill: 3  -     Stress Test With Myocardial Perfusion One Day; Future  -     CBC & Differential; Future    2. Essential hypertension  -     Comprehensive Metabolic Panel; Future    3. Type 2 diabetes mellitus with complication, with long-term current use of insulin  -     TSH; Future  -     Hemoglobin A1c; Future    4. SSS (sick sinus syndrome)  -     TSH; Future    5. Chest pressure  -     isosorbide mononitrate (IMDUR) 30 MG 24 hr tablet; Take 1 tablet by mouth Daily.  Dispense: 90 tablet; Refill: 3  -     Stress Test With Myocardial Perfusion One Day; Future    6. Shortness of breath    7. Dizziness    8. Hypercholesterolemia  -     Lipid Panel; Future    Other orders  -     ECG 12 Lead    At baseline her heart rate is upper limit of normal.  Her blood pressure is stable.  Her EKG showed paced rhythm with a ventricle.  Her clinical examination reveals a BMI of 27.  Her cardiovascular examination reveals mechanical second heart sound and a short systolic murmur at the mitral area    Regarding her ischemic heart disease, she has undergone stenting of the RCA multiple times and also bypass surgery to RCA.  She now having more chest pain.  I advised her to restart aspirin at 81 mg once  a day.  I also gave her prescription for isosorbide at 30 mg once a day.  I scheduled her to undergo stress test in the form of Lexiscan to rule out any ischemia causing the chest pain.    Regarding her hypertension, it seems to be controlled with a combination of Cozaar and metoprolol.  If she continues to have elevated blood pressure, she may need renal angiogram and possible stenting of the left renal artery.    Regarding her diabetes, she is on insulin and metformin.  Need to recheck her labs including A1c    Regarding sick sinus syndrome/AV block.  I like to get pacemaker interrogated to rule out any PMT.  If no other arrhythmia noted then I like to increase the dose of Lopressor to 100 mg.    Regarding hypercholesterolemia, she is on Zetia and Pravachol.  Will recheck the levels again.    Regarding the chest pressure, need to rule out ischemic heart disease.  Will get stress test and start her on Imdur    Regarding the shortness of breath, at this time she is on oxygen and is being followed by the pulmonologist    Regarding dizziness, it could be related to her heart rate.    Based on the results, further recommendations will be                    Electronically signed by Joelle Jay MD August 12, 2024 13:13 EDT

## 2024-08-27 LAB — INR PPP: 3.89

## 2024-08-28 ENCOUNTER — ANTICOAGULATION VISIT (OUTPATIENT)
Dept: CARDIOLOGY | Facility: CLINIC | Age: 64
End: 2024-08-28
Payer: MEDICARE

## 2024-09-03 RX ORDER — WARFARIN SODIUM 5 MG/1
TABLET ORAL
Qty: 90 TABLET | Refills: 1 | Status: SHIPPED | OUTPATIENT
Start: 2024-09-03

## 2024-09-11 DIAGNOSIS — Z95.2 S/P AVR: ICD-10-CM

## 2024-09-11 DIAGNOSIS — Z79.01 LONG TERM (CURRENT) USE OF ANTICOAGULANTS: ICD-10-CM

## 2024-09-17 ENCOUNTER — TELEPHONE (OUTPATIENT)
Dept: CARDIOLOGY | Facility: CLINIC | Age: 64
End: 2024-09-17
Payer: MEDICARE

## 2024-09-19 ENCOUNTER — TELEPHONE (OUTPATIENT)
Dept: CARDIOLOGY | Facility: CLINIC | Age: 64
End: 2024-09-19
Payer: MEDICARE

## 2024-09-19 RX ORDER — FUROSEMIDE 40 MG
40 TABLET ORAL DAILY
Qty: 10 TABLET | Refills: 0 | Status: SHIPPED | OUTPATIENT
Start: 2024-09-19 | End: 2024-09-29

## 2024-09-20 ENCOUNTER — TELEPHONE (OUTPATIENT)
Dept: CARDIOLOGY | Facility: CLINIC | Age: 64
End: 2024-09-20
Payer: MEDICARE

## 2024-09-20 LAB — INR PPP: 4.21

## 2024-09-23 ENCOUNTER — ANTICOAGULATION VISIT (OUTPATIENT)
Dept: CARDIOLOGY | Facility: CLINIC | Age: 64
End: 2024-09-23
Payer: MEDICARE

## 2024-09-23 DIAGNOSIS — Z95.2 S/P AVR: ICD-10-CM

## 2024-09-23 DIAGNOSIS — Z79.01 LONG TERM (CURRENT) USE OF ANTICOAGULANTS: ICD-10-CM

## 2024-09-26 RX ORDER — FUROSEMIDE 40 MG
TABLET ORAL
Qty: 10 TABLET | Refills: 0 | OUTPATIENT
Start: 2024-09-26

## 2024-10-11 LAB — INR PPP: 0.8

## 2024-10-14 ENCOUNTER — TELEPHONE (OUTPATIENT)
Dept: CARDIOLOGY | Facility: CLINIC | Age: 64
End: 2024-10-14
Payer: MEDICARE

## 2024-10-14 NOTE — TELEPHONE ENCOUNTER
Autumn with MD INR left , reporting INR done on 10/11/24. INR 0.8.  I sent Trice a message to make sure if she has these results or not.

## 2024-10-15 ENCOUNTER — ANTICOAGULATION VISIT (OUTPATIENT)
Dept: CARDIOLOGY | Facility: CLINIC | Age: 64
End: 2024-10-15
Payer: MEDICARE

## 2024-10-15 LAB — INR PPP: 1.6

## 2024-10-16 ENCOUNTER — ANTICOAGULATION VISIT (OUTPATIENT)
Dept: CARDIOLOGY | Facility: CLINIC | Age: 64
End: 2024-10-16
Payer: MEDICARE

## 2024-10-16 DIAGNOSIS — Z79.01 LONG TERM (CURRENT) USE OF ANTICOAGULANTS: ICD-10-CM

## 2024-10-16 DIAGNOSIS — Z95.2 S/P AVR: ICD-10-CM

## 2024-10-22 ENCOUNTER — TELEPHONE (OUTPATIENT)
Dept: CARDIOLOGY | Facility: CLINIC | Age: 64
End: 2024-10-22
Payer: MEDICARE